# Patient Record
Sex: MALE | Race: WHITE | Employment: FULL TIME | URBAN - METROPOLITAN AREA
[De-identification: names, ages, dates, MRNs, and addresses within clinical notes are randomized per-mention and may not be internally consistent; named-entity substitution may affect disease eponyms.]

---

## 2017-07-24 ENCOUNTER — HOSPITAL ENCOUNTER (EMERGENCY)
Age: 32
Discharge: HOME | End: 2017-07-24
Payer: COMMERCIAL

## 2017-07-24 ENCOUNTER — APPOINTMENT (OUTPATIENT)
Dept: GENERAL RADIOLOGY | Age: 32
End: 2017-07-24
Payer: COMMERCIAL

## 2017-07-24 VITALS
HEART RATE: 72 BPM | HEIGHT: 73 IN | BODY MASS INDEX: 31.14 KG/M2 | WEIGHT: 235 LBS | TEMPERATURE: 97.3 F | RESPIRATION RATE: 14 BRPM | OXYGEN SATURATION: 99 % | DIASTOLIC BLOOD PRESSURE: 83 MMHG | SYSTOLIC BLOOD PRESSURE: 121 MMHG

## 2017-07-24 DIAGNOSIS — S79.912A HIP INJURY, LEFT, INITIAL ENCOUNTER: Primary | ICD-10-CM

## 2017-07-24 PROCEDURE — 73502 X-RAY EXAM HIP UNI 2-3 VIEWS: CPT

## 2017-07-24 PROCEDURE — 99283 EMERGENCY DEPT VISIT LOW MDM: CPT

## 2017-07-24 RX ORDER — ACETAMINOPHEN 500 MG
1000 TABLET ORAL ONCE
Status: DISCONTINUED | OUTPATIENT
Start: 2017-07-24 | End: 2017-07-24

## 2017-07-24 RX ORDER — ACETAMINOPHEN 500 MG
500 TABLET ORAL EVERY 6 HOURS PRN
Qty: 12 TABLET | Refills: 0 | Status: SHIPPED | OUTPATIENT
Start: 2017-07-24 | End: 2017-08-16

## 2017-07-24 ASSESSMENT — PAIN DESCRIPTION - PAIN TYPE: TYPE: ACUTE PAIN

## 2017-07-24 ASSESSMENT — PAIN SCALES - GENERAL: PAINLEVEL_OUTOF10: 3

## 2017-07-24 ASSESSMENT — PAIN DESCRIPTION - LOCATION: LOCATION: HIP

## 2017-07-24 ASSESSMENT — PAIN DESCRIPTION - ORIENTATION: ORIENTATION: LEFT

## 2017-07-24 ASSESSMENT — PAIN DESCRIPTION - DESCRIPTORS: DESCRIPTORS: SORE

## 2017-08-11 ENCOUNTER — OFFICE VISIT (OUTPATIENT)
Dept: FAMILY MEDICINE CLINIC | Age: 32
End: 2017-08-11

## 2017-08-11 VITALS
WEIGHT: 241.2 LBS | TEMPERATURE: 97.6 F | HEART RATE: 75 BPM | HEIGHT: 73 IN | DIASTOLIC BLOOD PRESSURE: 78 MMHG | SYSTOLIC BLOOD PRESSURE: 118 MMHG | OXYGEN SATURATION: 97 % | BODY MASS INDEX: 31.97 KG/M2

## 2017-08-11 DIAGNOSIS — R91.8 PULMONARY INFILTRATE IN RIGHT LUNG ON CXR: Primary | ICD-10-CM

## 2017-08-11 DIAGNOSIS — R07.81 RIB PAIN ON RIGHT SIDE: Primary | ICD-10-CM

## 2017-08-11 PROCEDURE — 99213 OFFICE O/P EST LOW 20 MIN: CPT | Performed by: NURSE PRACTITIONER

## 2017-08-11 PROCEDURE — G8427 DOCREV CUR MEDS BY ELIG CLIN: HCPCS | Performed by: NURSE PRACTITIONER

## 2017-08-11 PROCEDURE — G8417 CALC BMI ABV UP PARAM F/U: HCPCS | Performed by: NURSE PRACTITIONER

## 2017-08-11 PROCEDURE — 1036F TOBACCO NON-USER: CPT | Performed by: NURSE PRACTITIONER

## 2017-08-11 RX ORDER — METHYLPREDNISOLONE 4 MG/1
TABLET ORAL
Qty: 21 TABLET | Refills: 0 | Status: SHIPPED | OUTPATIENT
Start: 2017-08-11 | End: 2017-08-16

## 2017-08-11 ASSESSMENT — ENCOUNTER SYMPTOMS
SHORTNESS OF BREATH: 0
COUGH: 0

## 2017-08-16 ENCOUNTER — OFFICE VISIT (OUTPATIENT)
Dept: PULMONOLOGY | Age: 32
End: 2017-08-16

## 2017-08-16 ENCOUNTER — HOSPITAL ENCOUNTER (OUTPATIENT)
Dept: CT IMAGING | Age: 32
Discharge: HOME | End: 2017-08-16
Payer: COMMERCIAL

## 2017-08-16 VITALS
DIASTOLIC BLOOD PRESSURE: 74 MMHG | TEMPERATURE: 97.4 F | HEART RATE: 79 BPM | BODY MASS INDEX: 32.47 KG/M2 | SYSTOLIC BLOOD PRESSURE: 112 MMHG | WEIGHT: 245 LBS | OXYGEN SATURATION: 98 % | HEIGHT: 73 IN | RESPIRATION RATE: 12 BRPM

## 2017-08-16 VITALS — BODY MASS INDEX: 32.47 KG/M2 | WEIGHT: 245 LBS | HEIGHT: 73 IN

## 2017-08-16 DIAGNOSIS — R07.81 PLEURODYNIA: ICD-10-CM

## 2017-08-16 DIAGNOSIS — R07.81 PLEURODYNIA: Primary | ICD-10-CM

## 2017-08-16 PROCEDURE — 71275 CT ANGIOGRAPHY CHEST: CPT

## 2017-08-16 PROCEDURE — G8427 DOCREV CUR MEDS BY ELIG CLIN: HCPCS | Performed by: INTERNAL MEDICINE

## 2017-08-16 PROCEDURE — 99204 OFFICE O/P NEW MOD 45 MIN: CPT | Performed by: INTERNAL MEDICINE

## 2017-08-16 PROCEDURE — 1036F TOBACCO NON-USER: CPT | Performed by: INTERNAL MEDICINE

## 2017-08-16 PROCEDURE — 2580000003 HC RX 258: Performed by: RADIOLOGY

## 2017-08-16 PROCEDURE — 6360000004 HC RX CONTRAST MEDICATION: Performed by: RADIOLOGY

## 2017-08-16 PROCEDURE — G8417 CALC BMI ABV UP PARAM F/U: HCPCS | Performed by: INTERNAL MEDICINE

## 2017-08-16 RX ORDER — SODIUM CHLORIDE 0.9 % (FLUSH) 0.9 %
10 SYRINGE (ML) INJECTION PRN
Status: DISCONTINUED | OUTPATIENT
Start: 2017-08-16 | End: 2017-08-19 | Stop reason: HOSPADM

## 2017-08-16 RX ADMIN — SODIUM CHLORIDE, PRESERVATIVE FREE 10 ML: 5 INJECTION INTRAVENOUS at 13:26

## 2017-08-16 RX ADMIN — IOPAMIDOL 100 ML: 755 INJECTION, SOLUTION INTRAVENOUS at 13:25

## 2017-08-16 ASSESSMENT — ENCOUNTER SYMPTOMS
COUGH: 0
RHINORRHEA: 0
VOMITING: 0
SORE THROAT: 0
WHEEZING: 1
NAUSEA: 0
SHORTNESS OF BREATH: 0
DIARRHEA: 0
ABDOMINAL PAIN: 0
CHEST TIGHTNESS: 0
SINUS PRESSURE: 0

## 2017-08-25 ENCOUNTER — OFFICE VISIT (OUTPATIENT)
Dept: PULMONOLOGY | Age: 32
End: 2017-08-25

## 2017-08-25 VITALS
OXYGEN SATURATION: 98 % | WEIGHT: 243 LBS | RESPIRATION RATE: 16 BRPM | HEART RATE: 68 BPM | BODY MASS INDEX: 32.2 KG/M2 | TEMPERATURE: 97.6 F | HEIGHT: 73 IN | DIASTOLIC BLOOD PRESSURE: 82 MMHG | SYSTOLIC BLOOD PRESSURE: 124 MMHG

## 2017-08-25 DIAGNOSIS — R07.81 PLEURODYNIA: Primary | ICD-10-CM

## 2017-08-25 PROCEDURE — G8417 CALC BMI ABV UP PARAM F/U: HCPCS | Performed by: INTERNAL MEDICINE

## 2017-08-25 PROCEDURE — G8427 DOCREV CUR MEDS BY ELIG CLIN: HCPCS | Performed by: INTERNAL MEDICINE

## 2017-08-25 PROCEDURE — 99213 OFFICE O/P EST LOW 20 MIN: CPT | Performed by: INTERNAL MEDICINE

## 2017-08-25 PROCEDURE — 1036F TOBACCO NON-USER: CPT | Performed by: INTERNAL MEDICINE

## 2017-08-25 RX ORDER — PREDNISONE 10 MG/1
TABLET ORAL
Qty: 40 TABLET | Refills: 0 | Status: SHIPPED | OUTPATIENT
Start: 2017-08-25 | End: 2018-12-20

## 2017-08-25 ASSESSMENT — ENCOUNTER SYMPTOMS
COUGH: 0
SORE THROAT: 0
CHEST TIGHTNESS: 0
ABDOMINAL PAIN: 0
WHEEZING: 0
VOMITING: 0
SHORTNESS OF BREATH: 1
RHINORRHEA: 0
SINUS PRESSURE: 0
NAUSEA: 0
DIARRHEA: 0

## 2017-09-01 ENCOUNTER — TELEPHONE (OUTPATIENT)
Dept: PULMONOLOGY | Age: 32
End: 2017-09-01

## 2017-09-01 ENCOUNTER — TELEPHONE (OUTPATIENT)
Dept: FAMILY MEDICINE CLINIC | Age: 32
End: 2017-09-01

## 2017-09-01 DIAGNOSIS — R07.81 PLEURODYNIA: Primary | ICD-10-CM

## 2017-09-01 RX ORDER — METHYLPREDNISOLONE 4 MG/1
TABLET ORAL
Qty: 21 TABLET | Refills: 0 | Status: SHIPPED | OUTPATIENT
Start: 2017-09-01 | End: 2017-09-07

## 2017-12-20 ENCOUNTER — TELEPHONE (OUTPATIENT)
Dept: FAMILY MEDICINE CLINIC | Age: 32
End: 2017-12-20

## 2018-05-11 ENCOUNTER — APPOINTMENT (OUTPATIENT)
Dept: GENERAL RADIOLOGY | Age: 33
End: 2018-05-11
Payer: COMMERCIAL

## 2018-05-11 ENCOUNTER — HOSPITAL ENCOUNTER (EMERGENCY)
Age: 33
Discharge: HOME | End: 2018-05-11
Payer: COMMERCIAL

## 2018-05-11 VITALS
BODY MASS INDEX: 30.48 KG/M2 | WEIGHT: 230 LBS | DIASTOLIC BLOOD PRESSURE: 78 MMHG | HEART RATE: 68 BPM | OXYGEN SATURATION: 100 % | TEMPERATURE: 97.5 F | HEIGHT: 73 IN | RESPIRATION RATE: 18 BRPM | SYSTOLIC BLOOD PRESSURE: 131 MMHG

## 2018-05-11 DIAGNOSIS — M25.562 ACUTE PAIN OF LEFT KNEE: Primary | ICD-10-CM

## 2018-05-11 PROCEDURE — 99283 EMERGENCY DEPT VISIT LOW MDM: CPT

## 2018-05-11 PROCEDURE — 73564 X-RAY EXAM KNEE 4 OR MORE: CPT

## 2018-05-11 ASSESSMENT — ENCOUNTER SYMPTOMS
GASTROINTESTINAL NEGATIVE: 1
RESPIRATORY NEGATIVE: 1
EYES NEGATIVE: 1

## 2018-05-11 ASSESSMENT — PAIN SCALES - GENERAL
PAINLEVEL_OUTOF10: 3
PAINLEVEL_OUTOF10: 2

## 2018-05-11 ASSESSMENT — PAIN DESCRIPTION - LOCATION: LOCATION: KNEE

## 2018-05-11 ASSESSMENT — PAIN DESCRIPTION - DESCRIPTORS: DESCRIPTORS: ACHING;DISCOMFORT

## 2018-05-11 ASSESSMENT — PAIN DESCRIPTION - FREQUENCY: FREQUENCY: CONTINUOUS

## 2018-05-11 ASSESSMENT — PAIN DESCRIPTION - ORIENTATION: ORIENTATION: LEFT

## 2018-05-11 ASSESSMENT — PAIN - FUNCTIONAL ASSESSMENT: PAIN_FUNCTIONAL_ASSESSMENT: 0-10

## 2018-05-25 ENCOUNTER — HOSPITAL ENCOUNTER (OUTPATIENT)
Dept: MRI IMAGING | Age: 33
Discharge: HOME | End: 2018-05-27
Payer: COMMERCIAL

## 2018-05-25 DIAGNOSIS — S80.02XA CONTUSION OF LEFT KNEE, INITIAL ENCOUNTER: ICD-10-CM

## 2018-05-25 PROCEDURE — 73721 MRI JNT OF LWR EXTRE W/O DYE: CPT

## 2018-12-20 ENCOUNTER — OFFICE VISIT (OUTPATIENT)
Dept: FAMILY MEDICINE CLINIC | Age: 33
End: 2018-12-20
Payer: COMMERCIAL

## 2018-12-20 VITALS
TEMPERATURE: 97.6 F | WEIGHT: 233 LBS | SYSTOLIC BLOOD PRESSURE: 124 MMHG | OXYGEN SATURATION: 97 % | BODY MASS INDEX: 30.88 KG/M2 | HEIGHT: 73 IN | DIASTOLIC BLOOD PRESSURE: 82 MMHG | HEART RATE: 70 BPM

## 2018-12-20 DIAGNOSIS — Z00.00 WELL ADULT EXAM: Primary | ICD-10-CM

## 2018-12-20 DIAGNOSIS — R49.9 CHANGE IN VOICE: ICD-10-CM

## 2018-12-20 PROCEDURE — 99395 PREV VISIT EST AGE 18-39: CPT | Performed by: NURSE PRACTITIONER

## 2018-12-20 PROCEDURE — G8484 FLU IMMUNIZE NO ADMIN: HCPCS | Performed by: NURSE PRACTITIONER

## 2018-12-20 ASSESSMENT — PATIENT HEALTH QUESTIONNAIRE - PHQ9
SUM OF ALL RESPONSES TO PHQ9 QUESTIONS 1 & 2: 0
1. LITTLE INTEREST OR PLEASURE IN DOING THINGS: 0
2. FEELING DOWN, DEPRESSED OR HOPELESS: 0
SUM OF ALL RESPONSES TO PHQ QUESTIONS 1-9: 0
SUM OF ALL RESPONSES TO PHQ QUESTIONS 1-9: 0

## 2018-12-20 ASSESSMENT — ENCOUNTER SYMPTOMS
SINUS PRESSURE: 0
TROUBLE SWALLOWING: 0
WHEEZING: 0
SORE THROAT: 0
SHORTNESS OF BREATH: 0
CHEST TIGHTNESS: 0
SINUS PAIN: 0
VOICE CHANGE: 1

## 2019-12-02 ENCOUNTER — APPOINTMENT (OUTPATIENT)
Dept: GENERAL RADIOLOGY | Age: 34
End: 2019-12-02
Payer: COMMERCIAL

## 2019-12-02 ENCOUNTER — HOSPITAL ENCOUNTER (EMERGENCY)
Age: 34
Discharge: HOME | End: 2019-12-02
Payer: COMMERCIAL

## 2019-12-02 VITALS
SYSTOLIC BLOOD PRESSURE: 125 MMHG | HEART RATE: 62 BPM | BODY MASS INDEX: 31.81 KG/M2 | OXYGEN SATURATION: 98 % | TEMPERATURE: 98.6 F | RESPIRATION RATE: 16 BRPM | DIASTOLIC BLOOD PRESSURE: 91 MMHG | WEIGHT: 240 LBS | HEIGHT: 73 IN

## 2019-12-02 DIAGNOSIS — M25.512 ARTHRALGIA OF SHOULDER REGION, LEFT: ICD-10-CM

## 2019-12-02 DIAGNOSIS — M25.552 ARTHRALGIA OF LEFT HIP: Primary | ICD-10-CM

## 2019-12-02 PROCEDURE — 99283 EMERGENCY DEPT VISIT LOW MDM: CPT

## 2019-12-02 PROCEDURE — 73502 X-RAY EXAM HIP UNI 2-3 VIEWS: CPT

## 2019-12-02 PROCEDURE — 73030 X-RAY EXAM OF SHOULDER: CPT

## 2019-12-02 RX ORDER — ACETAMINOPHEN 500 MG
1000 TABLET ORAL EVERY 6 HOURS PRN
Qty: 30 TABLET | Refills: 0 | Status: SHIPPED | OUTPATIENT
Start: 2019-12-02 | End: 2021-12-17

## 2019-12-02 RX ORDER — IBUPROFEN 800 MG/1
800 TABLET ORAL ONCE
Status: DISCONTINUED | OUTPATIENT
Start: 2019-12-02 | End: 2019-12-02 | Stop reason: HOSPADM

## 2019-12-02 ASSESSMENT — ENCOUNTER SYMPTOMS
COUGH: 0
RHINORRHEA: 0
SHORTNESS OF BREATH: 0
SORE THROAT: 0
EYE PAIN: 0
DIARRHEA: 0
BACK PAIN: 0
ABDOMINAL PAIN: 0
VOMITING: 0
NAUSEA: 0
PHOTOPHOBIA: 0

## 2019-12-02 ASSESSMENT — PAIN DESCRIPTION - ORIENTATION: ORIENTATION: LEFT

## 2019-12-02 ASSESSMENT — PAIN DESCRIPTION - LOCATION: LOCATION: HIP

## 2019-12-02 ASSESSMENT — PAIN SCALES - GENERAL: PAINLEVEL_OUTOF10: 3

## 2019-12-09 ENCOUNTER — HOSPITAL ENCOUNTER (OUTPATIENT)
Dept: PHYSICAL THERAPY | Age: 34
Setting detail: THERAPIES SERIES
Discharge: HOME | End: 2019-12-09
Payer: COMMERCIAL

## 2019-12-09 PROCEDURE — 97110 THERAPEUTIC EXERCISES: CPT

## 2019-12-09 PROCEDURE — 97162 PT EVAL MOD COMPLEX 30 MIN: CPT

## 2019-12-09 ASSESSMENT — PAIN DESCRIPTION - ONSET: ONSET: PROGRESSIVE

## 2019-12-09 ASSESSMENT — PAIN SCALES - GENERAL: PAINLEVEL_OUTOF10: 3

## 2019-12-09 ASSESSMENT — PAIN - FUNCTIONAL ASSESSMENT: PAIN_FUNCTIONAL_ASSESSMENT: PREVENTS OR INTERFERES SOME ACTIVE ACTIVITIES AND ADLS

## 2019-12-09 ASSESSMENT — PAIN DESCRIPTION - PROGRESSION: CLINICAL_PROGRESSION: GRADUALLY IMPROVING

## 2019-12-09 ASSESSMENT — PAIN DESCRIPTION - LOCATION: LOCATION: HIP;SHOULDER

## 2019-12-09 ASSESSMENT — PAIN DESCRIPTION - PAIN TYPE: TYPE: ACUTE PAIN

## 2019-12-09 ASSESSMENT — PAIN DESCRIPTION - FREQUENCY: FREQUENCY: CONTINUOUS

## 2019-12-09 ASSESSMENT — PAIN DESCRIPTION - DESCRIPTORS: DESCRIPTORS: ACHING;SHARP

## 2019-12-11 ENCOUNTER — HOSPITAL ENCOUNTER (OUTPATIENT)
Dept: PHYSICAL THERAPY | Age: 34
Setting detail: THERAPIES SERIES
Discharge: HOME | End: 2019-12-11
Payer: COMMERCIAL

## 2019-12-11 PROCEDURE — 97035 APP MDLTY 1+ULTRASOUND EA 15: CPT

## 2019-12-11 PROCEDURE — 97110 THERAPEUTIC EXERCISES: CPT

## 2019-12-11 PROCEDURE — 97124 MASSAGE THERAPY: CPT

## 2019-12-11 ASSESSMENT — PAIN SCALES - GENERAL: PAINLEVEL_OUTOF10: 3

## 2019-12-11 ASSESSMENT — PAIN DESCRIPTION - PROGRESSION: CLINICAL_PROGRESSION: GRADUALLY IMPROVING

## 2019-12-11 ASSESSMENT — PAIN DESCRIPTION - LOCATION: LOCATION: HIP;SHOULDER

## 2019-12-16 ENCOUNTER — HOSPITAL ENCOUNTER (OUTPATIENT)
Dept: PHYSICAL THERAPY | Age: 34
Setting detail: THERAPIES SERIES
Discharge: HOME | End: 2019-12-16
Payer: COMMERCIAL

## 2019-12-16 PROCEDURE — 97035 APP MDLTY 1+ULTRASOUND EA 15: CPT

## 2019-12-16 PROCEDURE — G0283 ELEC STIM OTHER THAN WOUND: HCPCS

## 2019-12-16 PROCEDURE — 97124 MASSAGE THERAPY: CPT

## 2019-12-16 PROCEDURE — 97110 THERAPEUTIC EXERCISES: CPT

## 2019-12-16 ASSESSMENT — PAIN SCALES - GENERAL: PAINLEVEL_OUTOF10: 3

## 2019-12-16 ASSESSMENT — PAIN DESCRIPTION - ORIENTATION: ORIENTATION: LEFT

## 2019-12-16 ASSESSMENT — PAIN DESCRIPTION - LOCATION: LOCATION: HIP

## 2019-12-21 ENCOUNTER — OFFICE VISIT (OUTPATIENT)
Dept: FAMILY MEDICINE CLINIC | Age: 34
End: 2019-12-21
Payer: COMMERCIAL

## 2019-12-21 VITALS
BODY MASS INDEX: 33.18 KG/M2 | TEMPERATURE: 96.6 F | HEART RATE: 75 BPM | OXYGEN SATURATION: 97 % | DIASTOLIC BLOOD PRESSURE: 74 MMHG | WEIGHT: 245 LBS | HEIGHT: 72 IN | SYSTOLIC BLOOD PRESSURE: 114 MMHG

## 2019-12-21 DIAGNOSIS — Z00.00 ENCOUNTER FOR PREVENTIVE HEALTH EXAMINATION: Primary | ICD-10-CM

## 2019-12-21 PROCEDURE — G8484 FLU IMMUNIZE NO ADMIN: HCPCS | Performed by: NURSE PRACTITIONER

## 2019-12-21 PROCEDURE — 99395 PREV VISIT EST AGE 18-39: CPT | Performed by: NURSE PRACTITIONER

## 2019-12-21 SDOH — ECONOMIC STABILITY: FOOD INSECURITY: WITHIN THE PAST 12 MONTHS, YOU WORRIED THAT YOUR FOOD WOULD RUN OUT BEFORE YOU GOT MONEY TO BUY MORE.: NEVER TRUE

## 2019-12-21 SDOH — ECONOMIC STABILITY: INCOME INSECURITY: HOW HARD IS IT FOR YOU TO PAY FOR THE VERY BASICS LIKE FOOD, HOUSING, MEDICAL CARE, AND HEATING?: NOT HARD AT ALL

## 2019-12-21 SDOH — ECONOMIC STABILITY: TRANSPORTATION INSECURITY
IN THE PAST 12 MONTHS, HAS THE LACK OF TRANSPORTATION KEPT YOU FROM MEDICAL APPOINTMENTS OR FROM GETTING MEDICATIONS?: NO

## 2019-12-21 SDOH — ECONOMIC STABILITY: TRANSPORTATION INSECURITY
IN THE PAST 12 MONTHS, HAS LACK OF TRANSPORTATION KEPT YOU FROM MEETINGS, WORK, OR FROM GETTING THINGS NEEDED FOR DAILY LIVING?: NO

## 2019-12-21 ASSESSMENT — ENCOUNTER SYMPTOMS
ABDOMINAL PAIN: 0
SHORTNESS OF BREATH: 0
COUGH: 0

## 2019-12-21 ASSESSMENT — PATIENT HEALTH QUESTIONNAIRE - PHQ9
SUM OF ALL RESPONSES TO PHQ QUESTIONS 1-9: 0
SUM OF ALL RESPONSES TO PHQ9 QUESTIONS 1 & 2: 0
1. LITTLE INTEREST OR PLEASURE IN DOING THINGS: 0
SUM OF ALL RESPONSES TO PHQ QUESTIONS 1-9: 0
2. FEELING DOWN, DEPRESSED OR HOPELESS: 0

## 2019-12-23 ENCOUNTER — HOSPITAL ENCOUNTER (OUTPATIENT)
Dept: PHYSICAL THERAPY | Age: 34
Setting detail: THERAPIES SERIES
Discharge: HOME | End: 2019-12-23
Payer: COMMERCIAL

## 2019-12-23 PROCEDURE — 97110 THERAPEUTIC EXERCISES: CPT

## 2019-12-23 PROCEDURE — 97124 MASSAGE THERAPY: CPT

## 2019-12-23 PROCEDURE — 97035 APP MDLTY 1+ULTRASOUND EA 15: CPT

## 2019-12-23 ASSESSMENT — PAIN DESCRIPTION - ORIENTATION: ORIENTATION: LEFT

## 2019-12-23 ASSESSMENT — PAIN DESCRIPTION - DESCRIPTORS: DESCRIPTORS: ACHING

## 2019-12-23 ASSESSMENT — PAIN SCALES - GENERAL: PAINLEVEL_OUTOF10: 4

## 2019-12-23 ASSESSMENT — PAIN DESCRIPTION - LOCATION: LOCATION: SHOULDER

## 2019-12-30 ENCOUNTER — HOSPITAL ENCOUNTER (OUTPATIENT)
Dept: PHYSICAL THERAPY | Age: 34
Setting detail: THERAPIES SERIES
Discharge: HOME | End: 2019-12-30
Payer: COMMERCIAL

## 2019-12-30 PROCEDURE — 97110 THERAPEUTIC EXERCISES: CPT

## 2019-12-30 PROCEDURE — G0283 ELEC STIM OTHER THAN WOUND: HCPCS

## 2019-12-30 ASSESSMENT — PAIN DESCRIPTION - ORIENTATION: ORIENTATION: LEFT

## 2019-12-30 ASSESSMENT — PAIN DESCRIPTION - LOCATION: LOCATION: HIP;SHOULDER

## 2019-12-30 ASSESSMENT — PAIN SCALES - GENERAL: PAINLEVEL_OUTOF10: 1

## 2019-12-30 NOTE — PROGRESS NOTES
[facility]   Outpatient Physical Therapy   Treatment Note  [] [address]  [x] [facility]  Date: 2019  Patient: Devin Joe  : 1985  ACCT #: [de-identified]  Referring Practitioner: Mook Quintanilla CNP  Diagnosis: left hip and shoulder pain    Visit Information:  PT Visit Information  Onset Date: 19  PT Insurance Information: [facility]  Total # of Visits Approved: 10  Total # of Visits to Date: 7  No Show: 0  Canceled Appointment: 0  Progress Note Counter: 7/10    SUBJECTIVE:   Subjective  Subjective: Pt reports hip is overall feeling good. Reports decrease pain in shoulder, however, still feeling unstable. HEP Compliance:  [x] Good [] Fair [] Poor [] Reports not doing due to:    PAIN   Location:   Pain Location: Hip, Shoulder  Pain Rating (0-10 pain scale):  Pain Level: 1(1/10 hip; 2/10 shldr)  Pain Description:     Action:  [x] Acceptable for treatment  []  Other:    OBJECTIVE:   Exercises  Exercise 1: left piriformis stretching 30s/3  Exercise 6: shoulder IR/ER active ROM in side lying 2 lb. Exercise 7: side lying  rows with LUE with 2 lbs. Exercise 8: prone hip flexor stretching 20 second holds  Exercise 9: prone middle trap strengthening 2 lbs left UE  Exercise 10: prone lower trap strengthenng 2 lbs left LUE  Exercise 11: prone rows left UE 4 lbs. Exercise 12: Prone triceps extension Left UE 4lbs. Exercise 13: sidelying LUE punches  Exercise 17: wall ball exercises up/down and circular motion each direction    Modalities: []  NA  Modalities  E-stim (parameters): IFC left shoulder to decrease pain and inflammation x 15min       HEP  Continue with current Home Exercise Program.  See Objective section for progression of HEP. Comments:       POST-PAIN    Pain Rating (0-10 pain scale): 2/10  Location and Pain Description same as pre-pain unless otherwise indicated.   Action: [] NA  [] Call Physician  [x] Perform HEP

## 2020-01-02 ENCOUNTER — HOSPITAL ENCOUNTER (OUTPATIENT)
Dept: PHYSICAL THERAPY | Age: 35
Setting detail: THERAPIES SERIES
Discharge: HOME | End: 2020-01-02
Payer: COMMERCIAL

## 2020-01-02 PROCEDURE — 97110 THERAPEUTIC EXERCISES: CPT

## 2020-01-02 PROCEDURE — G0283 ELEC STIM OTHER THAN WOUND: HCPCS

## 2020-01-02 PROCEDURE — 97140 MANUAL THERAPY 1/> REGIONS: CPT

## 2020-01-02 ASSESSMENT — PAIN SCALES - GENERAL: PAINLEVEL_OUTOF10: 1

## 2020-01-02 ASSESSMENT — PAIN DESCRIPTION - ORIENTATION: ORIENTATION: LEFT

## 2020-01-02 ASSESSMENT — PAIN DESCRIPTION - DESCRIPTORS: DESCRIPTORS: SORE

## 2020-01-02 ASSESSMENT — PAIN DESCRIPTION - LOCATION: LOCATION: HIP;SHOULDER

## 2020-12-18 ENCOUNTER — OFFICE VISIT (OUTPATIENT)
Dept: FAMILY MEDICINE CLINIC | Age: 35
End: 2020-12-18
Payer: COMMERCIAL

## 2020-12-18 VITALS
SYSTOLIC BLOOD PRESSURE: 102 MMHG | OXYGEN SATURATION: 99 % | BODY MASS INDEX: 33.13 KG/M2 | HEIGHT: 73 IN | TEMPERATURE: 97.4 F | HEART RATE: 77 BPM | WEIGHT: 250 LBS | DIASTOLIC BLOOD PRESSURE: 82 MMHG

## 2020-12-18 PROCEDURE — G8484 FLU IMMUNIZE NO ADMIN: HCPCS | Performed by: NURSE PRACTITIONER

## 2020-12-18 PROCEDURE — 99395 PREV VISIT EST AGE 18-39: CPT | Performed by: NURSE PRACTITIONER

## 2020-12-18 ASSESSMENT — ENCOUNTER SYMPTOMS
SHORTNESS OF BREATH: 0
ABDOMINAL DISTENTION: 0
ABDOMINAL PAIN: 0
BACK PAIN: 0
CHEST TIGHTNESS: 0
COUGH: 0
PHOTOPHOBIA: 0

## 2020-12-18 ASSESSMENT — PATIENT HEALTH QUESTIONNAIRE - PHQ9
SUM OF ALL RESPONSES TO PHQ QUESTIONS 1-9: 0
1. LITTLE INTEREST OR PLEASURE IN DOING THINGS: 0
2. FEELING DOWN, DEPRESSED OR HOPELESS: 0
SUM OF ALL RESPONSES TO PHQ QUESTIONS 1-9: 0
SUM OF ALL RESPONSES TO PHQ QUESTIONS 1-9: 0
SUM OF ALL RESPONSES TO PHQ9 QUESTIONS 1 & 2: 0

## 2020-12-18 NOTE — PROGRESS NOTES
Subjective  Chief Complaint   Patient presents with    Employment Physical       HPI    Pt here for annual physical exam.    No issues or concerns. Work is going well, works at [employer]. Mentions his weight is more than he would like it. Goal is to get to 200 lbs. Unsure of date of last tdap. 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 Past Surgical History:   Procedure Laterality Date    ANTERIOR CRUCIATE LIGAMENT REPAIR Left 2003    KNEE CARTILAGE SURGERY Left 2003    TONSILLECTOMY Bilateral 2010    WISDOM TOOTH EXTRACTION  1998    3 extracted     No family history on file.   Social History     Socioeconomic History    Marital status:      Spouse name: None    Number of children: None    Years of education: None    Highest education level: None   Occupational History    None   Social Needs    Financial resource strain: Not hard at all   Myrtle-Anamika insecurity     Worry: Never true     Inability: None    Transportation needs     Medical: No     Non-medical: No   Tobacco Use    Smoking status: Never Smoker    Smokeless tobacco: Never Used   Substance and Sexual Activity    Alcohol use: None     Comment: rarely    Drug use: No    Sexual activity: None   Lifestyle    Physical activity     Days per week: None     Minutes per session: None    Stress: None   Relationships    Social connections     Talks on phone: None     Gets together: None     Attends Judaism service: None     Active member of club or organization: None     Attends meetings of clubs or organizations: None     Relationship status: None    Intimate partner violence     Fear of current or ex partner: None     Emotionally abused: None     Physically abused: None     Forced sexual activity: None Right Ear: Hearing, tympanic membrane, ear canal and external ear normal.      Left Ear: Hearing, tympanic membrane, ear canal and external ear normal.      Nose: Nose normal. No congestion or rhinorrhea. Mouth/Throat:      Mouth: Mucous membranes are moist.      Pharynx: Oropharynx is clear. No oropharyngeal exudate or posterior oropharyngeal erythema. Eyes:      General:         Right eye: No discharge. Left eye: No discharge. Conjunctiva/sclera: Conjunctivae normal.      Pupils: Pupils are equal, round, and reactive to light. Neck:      Musculoskeletal: Normal range of motion and neck supple. No muscular tenderness. Thyroid: No thyromegaly. Cardiovascular:      Rate and Rhythm: Normal rate and regular rhythm. Pulses: Normal pulses. Heart sounds: Normal heart sounds. No murmur. Pulmonary:      Effort: Pulmonary effort is normal. No respiratory distress. Breath sounds: Normal breath sounds. No stridor. No wheezing, rhonchi or rales. Chest:      Chest wall: No tenderness. Musculoskeletal: Normal range of motion. Right lower leg: No edema. Left lower leg: No edema. Lymphadenopathy:      Cervical: No cervical adenopathy. Skin:     General: Skin is warm and dry. Capillary Refill: Capillary refill takes less than 2 seconds. Coloration: Skin is not jaundiced or pale. Findings: No bruising, erythema, lesion or rash. Neurological:      General: No focal deficit present. Mental Status: He is alert and oriented to person, place, and time. Mental status is at baseline. Cranial Nerves: No cranial nerve deficit. Coordination: Coordination normal.      Gait: Gait normal.   Psychiatric:         Mood and Affect: Mood normal.         Speech: Speech normal.         Behavior: Behavior normal.         Thought Content:  Thought content normal.         Judgment: Judgment normal.         Assessment& Plan

## 2021-12-10 ENCOUNTER — OFFICE VISIT (OUTPATIENT)
Dept: FAMILY MEDICINE CLINIC | Age: 36
End: 2021-12-10
Payer: COMMERCIAL

## 2021-12-10 VITALS
OXYGEN SATURATION: 98 % | BODY MASS INDEX: 33.13 KG/M2 | SYSTOLIC BLOOD PRESSURE: 130 MMHG | TEMPERATURE: 97.8 F | HEIGHT: 73 IN | WEIGHT: 250 LBS | DIASTOLIC BLOOD PRESSURE: 80 MMHG | HEART RATE: 80 BPM

## 2021-12-10 DIAGNOSIS — Z00.00 ENCOUNTER FOR PREVENTIVE HEALTH EXAMINATION: Primary | ICD-10-CM

## 2021-12-10 DIAGNOSIS — Z13.220 LIPID SCREENING: ICD-10-CM

## 2021-12-10 DIAGNOSIS — R45.89 DEPRESSED MOOD: ICD-10-CM

## 2021-12-10 DIAGNOSIS — Z23 NEED FOR TDAP VACCINATION: ICD-10-CM

## 2021-12-10 DIAGNOSIS — Z00.00 ENCOUNTER FOR PREVENTIVE HEALTH EXAMINATION: ICD-10-CM

## 2021-12-10 LAB
ALBUMIN SERPL-MCNC: 4.9 G/DL (ref 3.5–4.6)
ALP BLD-CCNC: 101 U/L (ref 35–104)
ALT SERPL-CCNC: 37 U/L (ref 0–41)
ANION GAP SERPL CALCULATED.3IONS-SCNC: 19 MEQ/L (ref 9–15)
AST SERPL-CCNC: 31 U/L (ref 0–40)
BILIRUB SERPL-MCNC: 0.3 MG/DL (ref 0.2–0.7)
BUN BLDV-MCNC: 23 MG/DL (ref 6–20)
CALCIUM SERPL-MCNC: 9.5 MG/DL (ref 8.5–9.9)
CHLORIDE BLD-SCNC: 103 MEQ/L (ref 95–107)
CHOLESTEROL, TOTAL: 263 MG/DL (ref 0–199)
CO2: 21 MEQ/L (ref 20–31)
CREAT SERPL-MCNC: 0.86 MG/DL (ref 0.7–1.2)
GFR AFRICAN AMERICAN: >60
GFR NON-AFRICAN AMERICAN: >60
GLOBULIN: 2.7 G/DL (ref 2.3–3.5)
GLUCOSE BLD-MCNC: 111 MG/DL (ref 70–99)
HCT VFR BLD CALC: 46.9 % (ref 42–52)
HDLC SERPL-MCNC: 47 MG/DL (ref 40–59)
HEMOGLOBIN: 15.4 G/DL (ref 14–18)
LDL CHOLESTEROL CALCULATED: 152 MG/DL (ref 0–129)
MCH RBC QN AUTO: 28.9 PG (ref 27–31.3)
MCHC RBC AUTO-ENTMCNC: 32.9 % (ref 33–37)
MCV RBC AUTO: 87.8 FL (ref 80–100)
PDW BLD-RTO: 13.7 % (ref 11.5–14.5)
PLATELET # BLD: 223 K/UL (ref 130–400)
POTASSIUM SERPL-SCNC: 4.2 MEQ/L (ref 3.4–4.9)
RBC # BLD: 5.34 M/UL (ref 4.7–6.1)
SODIUM BLD-SCNC: 143 MEQ/L (ref 135–144)
TOTAL PROTEIN: 7.6 G/DL (ref 6.3–8)
TRIGL SERPL-MCNC: 322 MG/DL (ref 0–150)
WBC # BLD: 8.6 K/UL (ref 4.8–10.8)

## 2021-12-10 PROCEDURE — 90471 IMMUNIZATION ADMIN: CPT | Performed by: NURSE PRACTITIONER

## 2021-12-10 PROCEDURE — 90715 TDAP VACCINE 7 YRS/> IM: CPT | Performed by: NURSE PRACTITIONER

## 2021-12-10 PROCEDURE — G8484 FLU IMMUNIZE NO ADMIN: HCPCS | Performed by: NURSE PRACTITIONER

## 2021-12-10 PROCEDURE — 99395 PREV VISIT EST AGE 18-39: CPT | Performed by: NURSE PRACTITIONER

## 2021-12-10 SDOH — ECONOMIC STABILITY: FOOD INSECURITY: WITHIN THE PAST 12 MONTHS, YOU WORRIED THAT YOUR FOOD WOULD RUN OUT BEFORE YOU GOT MONEY TO BUY MORE.: NEVER TRUE

## 2021-12-10 SDOH — ECONOMIC STABILITY: FOOD INSECURITY: WITHIN THE PAST 12 MONTHS, THE FOOD YOU BOUGHT JUST DIDN'T LAST AND YOU DIDN'T HAVE MONEY TO GET MORE.: NEVER TRUE

## 2021-12-10 ASSESSMENT — ENCOUNTER SYMPTOMS
TROUBLE SWALLOWING: 0
SHORTNESS OF BREATH: 0
COUGH: 0
CHEST TIGHTNESS: 0
ABDOMINAL PAIN: 0
BACK PAIN: 0
CONSTIPATION: 0
DIARRHEA: 0
EYE PAIN: 0
COLOR CHANGE: 0

## 2021-12-10 ASSESSMENT — PATIENT HEALTH QUESTIONNAIRE - PHQ9
9. THOUGHTS THAT YOU WOULD BE BETTER OFF DEAD, OR OF HURTING YOURSELF: 0
3. TROUBLE FALLING OR STAYING ASLEEP: 0
2. FEELING DOWN, DEPRESSED OR HOPELESS: 3
10. IF YOU CHECKED OFF ANY PROBLEMS, HOW DIFFICULT HAVE THESE PROBLEMS MADE IT FOR YOU TO DO YOUR WORK, TAKE CARE OF THINGS AT HOME, OR GET ALONG WITH OTHER PEOPLE: 1
SUM OF ALL RESPONSES TO PHQ QUESTIONS 1-9: 8
SUM OF ALL RESPONSES TO PHQ QUESTIONS 1-9: 8
5. POOR APPETITE OR OVEREATING: 0
SUM OF ALL RESPONSES TO PHQ9 QUESTIONS 1 & 2: 5
1. LITTLE INTEREST OR PLEASURE IN DOING THINGS: 2
6. FEELING BAD ABOUT YOURSELF - OR THAT YOU ARE A FAILURE OR HAVE LET YOURSELF OR YOUR FAMILY DOWN: 3
SUM OF ALL RESPONSES TO PHQ QUESTIONS 1-9: 8
8. MOVING OR SPEAKING SO SLOWLY THAT OTHER PEOPLE COULD HAVE NOTICED. OR THE OPPOSITE, BEING SO FIGETY OR RESTLESS THAT YOU HAVE BEEN MOVING AROUND A LOT MORE THAN USUAL: 0
4. FEELING TIRED OR HAVING LITTLE ENERGY: 0

## 2021-12-10 ASSESSMENT — SOCIAL DETERMINANTS OF HEALTH (SDOH): HOW HARD IS IT FOR YOU TO PAY FOR THE VERY BASICS LIKE FOOD, HOUSING, MEDICAL CARE, AND HEATING?: NOT HARD AT ALL

## 2021-12-10 NOTE — PROGRESS NOTES
After obtaining consent, and per orders of Dr. Eve Rice, injection of tdap given in Left deltoid by Tasha Peña MA. Patient instructed to remain in clinic for 20 minutes afterwards, and to report any adverse reaction to me immediately.

## 2021-12-10 NOTE — PROGRESS NOTES
Subjective  Chief Complaint   Patient presents with    Annual Exam     INS    Depression     scored an [address] Maintenance     flu declined       HPI    Annual physical exam.    Recently -doing okay, but some depression while dealing with it. Feels he is getting through it okay for now and things are getting better. Declines medication or counseling. Declines covid vaccine at this time. BP well controlled/stable. Due for screening labs. Is agreeable to updated tdap today. 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 Past Surgical History:   Procedure Laterality Date    ANTERIOR CRUCIATE LIGAMENT REPAIR Left 2003    KNEE CARTILAGE SURGERY Left 2003    TONSILLECTOMY Bilateral 2010    WISDOM TOOTH EXTRACTION  1998    3 extracted     No family history on file. Social History     Socioeconomic History    Marital status:      Spouse name: None    Number of children: None    Years of education: None    Highest education level: None   Occupational History    None   Tobacco Use    Smoking status: Never Smoker    Smokeless tobacco: Never Used   Substance and Sexual Activity    Alcohol use: None     Comment: rarely    Drug use: No    Sexual activity: None   Other Topics Concern    None   Social History Narrative    None     Social Determinants of Health     Financial Resource Strain: Low Risk     Difficulty of Paying Living Expenses: Not hard at all   Food Insecurity: No Food Insecurity    Worried About Running Out of Food in the Last Year: Never true    [address] in the Last Year: Never true   Transportation Needs: Unknown    Lack of Transportation (Medical): No    Lack of Transportation (Non-Medical):  Not on file   Physical Activity:     Days of Exercise per Week: Not on file    Minutes of Exercise per Session: Not on file   Stress:     Feeling of Stress : Not on file   Social Connections:     Frequency of Communication with Friends and Family: Not on file    Frequency of Social Gatherings with Friends and Family: Not on file    Attends Christianity Services: Not on file    Active Member of [address] [place] [address] or Organizations: Not on file    Attends Club or Organization Meetings: Not on file    Marital Status: Not on file   Intimate Partner Violence:     Fear of Current or Ex-Partner: Not on file    Emotionally Abused: Not on file    Physically Abused: Not on file    Sexually Abused: Not on file   Housing Stability:     Unable to Pay for Housing in the Last Year: Not on file    Number of [place] in the Last Year: Not on file    Unstable Housing in the Last Year: Not on file     Current Outpatient Medications on File Prior to Visit   Medication Sig Dispense Refill    acetaminophen (APAP EXTRA STRENGTH) 500 MG tablet Take 2 tablets by mouth every 6 hours as needed for Pain 30 tablet 0     No current facility-administered medications on file prior to visit. Allergies   Allergen Reactions    Ibuprofen Other (See Comments)     Pt only has 1 kidney     Penicillins Other (See Comments)     Hereditary hx    Sulphadimidine [Sulfamethazine] Other (See Comments)     Hereditary hx, all sulpha products       Review of Systems   Constitutional: Negative for activity change, appetite change, chills, diaphoresis, fatigue and fever. HENT: Negative for congestion, ear pain, hearing loss and trouble swallowing. Eyes: Negative for pain and visual disturbance. Respiratory: Negative for cough, chest tightness and shortness of breath. Cardiovascular: Negative for chest pain, palpitations and leg swelling. Gastrointestinal: Negative for abdominal pain, constipation and diarrhea. Endocrine: Negative for polydipsia, polyphagia and polyuria.    Genitourinary: Negative for difficulty urinating. Musculoskeletal: Negative for arthralgias and back pain. Skin: Negative for color change and rash. Neurological: Negative for dizziness and light-headedness. Psychiatric/Behavioral: Positive for dysphoric mood. The patient is not nervous/anxious. Objective  Vitals:    12/10/21 0740   BP: 130/80   Pulse: 80   Temp: 97.8 °F (36.6 °C)   SpO2: 98%   Weight: 250 lb (113.4 kg)   Height: 6' 1\" (1.854 m)     Physical Exam  Vitals reviewed. Constitutional:       General: He is not in acute distress. Appearance: Normal appearance. He is well-developed and normal weight. He is not diaphoretic. HENT:      Head: Normocephalic and atraumatic. Right Ear: Hearing, tympanic membrane, ear canal and external ear normal.      Left Ear: Hearing, tympanic membrane, ear canal and external ear normal.      Nose: Nose normal. No congestion or rhinorrhea. Mouth/Throat:      Mouth: Mucous membranes are moist.      Pharynx: Oropharynx is clear. No oropharyngeal exudate or posterior oropharyngeal erythema. Eyes:      General:         Right eye: No discharge. Left eye: No discharge. Conjunctiva/sclera: Conjunctivae normal.      Pupils: Pupils are equal, round, and reactive to light. Neck:      Thyroid: No thyromegaly. Cardiovascular:      Rate and Rhythm: Normal rate and regular rhythm. Pulses: Normal pulses. Heart sounds: Normal heart sounds. No murmur heard. Pulmonary:      Effort: Pulmonary effort is normal. No respiratory distress. Breath sounds: Normal breath sounds. No stridor. No wheezing, rhonchi or rales. Chest:      Chest wall: No tenderness. Musculoskeletal:         General: Normal range of motion. Cervical back: Normal range of motion and neck supple. No muscular tenderness. Right lower leg: No edema. Left lower leg: No edema. Lymphadenopathy:      Cervical: No cervical adenopathy.    Skin:     General: Skin is warm and dry.      Capillary Refill: Capillary refill takes less than 2 seconds. Coloration: Skin is not jaundiced or pale. Findings: No bruising, erythema, lesion or rash. Neurological:      General: No focal deficit present. Mental Status: He is alert and oriented to person, place, and time. Mental status is at baseline. Cranial Nerves: No cranial nerve deficit. Coordination: Coordination normal.      Gait: Gait normal.   Psychiatric:         Mood and Affect: Mood normal.         Speech: Speech normal.         Behavior: Behavior normal.         Thought Content: Thought content normal.         Judgment: Judgment normal.         Assessment& Plan     Diagnosis Orders   1. Encounter for preventive health examination  CBC    Comprehensive Metabolic Panel   2. Lipid screening  Lipid Panel   3. Depressed mood     4. Need for Tdap vaccination  Tdap (age 6y and older) IM ([address])     Check labs. Update tdap. Will f/u if needed for depression. F/u annually and PRN>  Side effects, adverse effects of the medication prescribed today, as well as treatment plan/ rationale and result expectations have been discussed with the patient who expresses understanding and desires to proceed. Close follow up to evaluate treatment results and for coordination of care. I have reviewed the patient's medical history in detail and updated the computerized patient record. As always, patient is advised that if symptoms worsen in any way they will proceed to the nearest emergency room.        Orders Placed This Encounter   Procedures    Tdap (age 6y and older) IM ([address])    CBC     Standing Status:   Future     Number of Occurrences:   1     Standing Expiration Date:   12/10/2022    Lipid Panel     Standing Status:   Future     Number of Occurrences:   1     Standing Expiration Date:   12/10/2022     Order Specific Question:   Is Patient Fasting?/# of Hours     Answer:   10    Comprehensive Metabolic Panel Standing Status:   Future     Number of Occurrences:   1     Standing Expiration Date:   12/10/2022       No orders of the defined types were placed in this encounter. There are no discontinued medications.     Return in about 1 year (around 12/10/2022) for annual exam.    Terry Coyle, NUSRAT - CNP

## 2021-12-14 DIAGNOSIS — R73.9 BLOOD GLUCOSE ELEVATED: Primary | ICD-10-CM

## 2021-12-14 LAB — HBA1C MFR BLD: 5.8 % (ref 4.8–5.9)

## 2021-12-17 ENCOUNTER — OFFICE VISIT (OUTPATIENT)
Dept: FAMILY MEDICINE CLINIC | Age: 36
End: 2021-12-17
Payer: COMMERCIAL

## 2021-12-17 VITALS
DIASTOLIC BLOOD PRESSURE: 84 MMHG | SYSTOLIC BLOOD PRESSURE: 118 MMHG | WEIGHT: 251 LBS | OXYGEN SATURATION: 98 % | BODY MASS INDEX: 33.27 KG/M2 | HEIGHT: 73 IN | HEART RATE: 84 BPM | TEMPERATURE: 98 F

## 2021-12-17 DIAGNOSIS — E78.1 HYPERTRIGLYCERIDEMIA: ICD-10-CM

## 2021-12-17 DIAGNOSIS — R73.03 PREDIABETES: Primary | ICD-10-CM

## 2021-12-17 PROCEDURE — 1036F TOBACCO NON-USER: CPT | Performed by: NURSE PRACTITIONER

## 2021-12-17 PROCEDURE — G8427 DOCREV CUR MEDS BY ELIG CLIN: HCPCS | Performed by: NURSE PRACTITIONER

## 2021-12-17 PROCEDURE — G8484 FLU IMMUNIZE NO ADMIN: HCPCS | Performed by: NURSE PRACTITIONER

## 2021-12-17 PROCEDURE — 99214 OFFICE O/P EST MOD 30 MIN: CPT | Performed by: NURSE PRACTITIONER

## 2021-12-17 PROCEDURE — G8417 CALC BMI ABV UP PARAM F/U: HCPCS | Performed by: NURSE PRACTITIONER

## 2021-12-17 ASSESSMENT — ENCOUNTER SYMPTOMS
EYE PAIN: 0
COUGH: 0
DIARRHEA: 0
TROUBLE SWALLOWING: 0
CONSTIPATION: 0
SHORTNESS OF BREATH: 0
CHEST TIGHTNESS: 0
ABDOMINAL PAIN: 0

## 2021-12-17 NOTE — PROGRESS NOTES
Subjective  Chief Complaint   Patient presents with    Discuss Labs       HPI     Pt here to follow up on labs. Labs as follows:    Lab Results   Component Value Date    WBC 8.6 12/10/2021    HGB 15.4 12/10/2021    HCT 46.9 12/10/2021     12/10/2021    CHOL 263 (H) 12/10/2021    TRIG 322 (H) 12/10/2021    HDL 47 12/10/2021    ALT 37 12/10/2021    AST 31 12/10/2021     12/10/2021    K 4.2 12/10/2021     12/10/2021    CREATININE 0.86 12/10/2021    BUN 23 (H) 12/10/2021    CO2 21 12/10/2021    LABA1C 5.8 12/10/2021     Pt admits that he has not been eating healthy or exercising d/t some depression related to recent divorce. Also had drank heavily for 3 nights prior to getting labs drawn. He is now focused on making healthier choices. 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 Past Surgical History:   Procedure Laterality Date    ANTERIOR CRUCIATE LIGAMENT REPAIR Left 2003    KNEE CARTILAGE SURGERY Left 2003    TONSILLECTOMY Bilateral 2010    WISDOM TOOTH EXTRACTION  1998    3 extracted     No family history on file.   Social History     Socioeconomic History    Marital status:      Spouse name: None    Number of children: None    Years of education: None    Highest education level: None   Occupational History    None   Tobacco Use    Smoking status: Never Smoker    Smokeless tobacco: Never Used   Substance and Sexual Activity    Alcohol use: None     Comment: rarely    Drug use: No    Sexual activity: None   Other Topics Concern    None   Social History Narrative    None     Social Determinants of Health     Financial Resource Strain: Low Risk     Difficulty of Paying Living Expenses: Not hard at all   Food Insecurity: No Food Insecurity    Worried About Running Out of Food in the Last Year: Never true    [address] in the Last Year: Never true   Transportation Needs: Unknown    Lack of Transportation (Medical): No    Lack of Transportation (Non-Medical): Not on file   Physical Activity:     Days of Exercise per Week: Not on file    Minutes of Exercise per Session: Not on file   Stress:     Feeling of Stress : Not on file   Social Connections:     Frequency of Communication with Friends and Family: Not on file    Frequency of Social Gatherings with Friends and Family: Not on file    Attends Episcopal Services: Not on file    Active Member of [address] [place] [address] [employer] or Organizations: Not on file    Attends Club or Organization Meetings: Not on file    Marital Status: Not on file   Intimate Partner Violence:     Fear of Current or Ex-Partner: Not on file    Emotionally Abused: Not on file    Physically Abused: Not on file    Sexually Abused: Not on file   Housing Stability:     Unable to Pay for Housing in the Last Year: Not on file    Number of [place] in the Last Year: Not on file    Unstable Housing in the Last Year: Not on file     No current outpatient medications on file prior to visit. No current facility-administered medications on file prior to visit. Allergies   Allergen Reactions    Ibuprofen Other (See Comments)     Pt only has 1 kidney     Penicillins Other (See Comments)     Hereditary hx    Sulphadimidine [Sulfamethazine] Other (See Comments)     Hereditary hx, all sulpha products       Review of Systems   Constitutional: Negative for activity change, appetite change and fatigue. HENT: Negative for ear pain, hearing loss and trouble swallowing. Eyes: Negative for pain and visual disturbance. Respiratory: Negative for cough, chest tightness and shortness of breath. Cardiovascular: Negative for chest pain, palpitations and leg swelling. Gastrointestinal: Negative for abdominal pain, constipation and diarrhea.    Genitourinary: Negative for difficulty urinating. Neurological: Negative for dizziness and light-headedness. Objective  Vitals:    12/17/21 0735   BP: 118/84   Site: Right Upper Arm   Position: Sitting   Cuff Size: Large Adult   Pulse: 84   Temp: 98 °F (36.7 °C)   TempSrc: Infrared   SpO2: 98%   Weight: 251 lb (113.9 kg)   Height: 6' 1\" (1.854 m)     Physical Exam  Constitutional:       General: He is not in acute distress. Appearance: Normal appearance. He is obese. He is not ill-appearing, toxic-appearing or diaphoretic. HENT:      Head: Normocephalic and atraumatic. Right Ear: External ear normal.      Left Ear: External ear normal.      Nose: Nose normal. No congestion or rhinorrhea. Eyes:      Extraocular Movements: Extraocular movements intact. Conjunctiva/sclera: Conjunctivae normal.      Pupils: Pupils are equal, round, and reactive to light. Cardiovascular:      Rate and Rhythm: Normal rate and regular rhythm. Pulses: Normal pulses. Heart sounds: Normal heart sounds. No murmur heard. Pulmonary:      Effort: Pulmonary effort is normal. No respiratory distress. Breath sounds: Normal breath sounds. No stridor. No wheezing, rhonchi or rales. Chest:      Chest wall: No tenderness. Musculoskeletal:         General: Normal range of motion. Cervical back: Normal range of motion and neck supple. No tenderness. Right lower leg: No edema. Left lower leg: No edema. Lymphadenopathy:      Cervical: No cervical adenopathy. Skin:     General: Skin is warm. Capillary Refill: Capillary refill takes less than 2 seconds. Coloration: Skin is not jaundiced. Findings: No erythema or lesion. Neurological:      General: No focal deficit present. Mental Status: He is alert and oriented to person, place, and time. Mental status is at baseline. Cranial Nerves: No cranial nerve deficit.       Coordination: Coordination normal.      Gait: Gait normal. Psychiatric:         Mood and Affect: Mood normal.         Behavior: Behavior normal.         Thought Content: Thought content normal.         Judgment: Judgment normal.         Assessment& Plan     Diagnosis Orders   1. Prediabetes  Hemoglobin A1C   2. Hypertriglyceridemia  Lipid Panel     Healthy diet, decreased alcohol consumption, avoidance of simple carbs, moderation, increase exercise. F/u in 6 months with labs. Side effects, adverse effects of the medication prescribed today, as well as treatment plan/ rationale and result expectations have been discussed with the patient who expresses understanding and desires to proceed. Close follow up to evaluate treatment results and for coordination of care. I have reviewed the patient's medical history in detail and updated the computerized patient record. As always, patient is advised that if symptoms worsen in any way they will proceed to the nearest emergency room. Orders Placed This Encounter   Procedures    Lipid Panel     Standing Status:   Future     Standing Expiration Date:   12/17/2022     Order Specific Question:   Is Patient Fasting?/# of Hours     Answer:   10    Hemoglobin A1C     Standing Status:   Future     Standing Expiration Date:   12/17/2022       No orders of the defined types were placed in this encounter. Medications Discontinued During This Encounter   Medication Reason    acetaminophen (APAP EXTRA STRENGTH) 500 MG tablet LIST CLEANUP       Return in about 6 months (around 6/17/2022) for prediabetes, lipid.     Tonya Rendon, APRN - CNP

## 2022-06-02 ENCOUNTER — HOSPITAL ENCOUNTER (OUTPATIENT)
Dept: CT IMAGING | Age: 37
Discharge: HOME | End: 2022-06-04
Payer: COMMERCIAL

## 2022-06-02 ENCOUNTER — OFFICE VISIT (OUTPATIENT)
Dept: FAMILY MEDICINE CLINIC | Age: 37
End: 2022-06-02
Payer: COMMERCIAL

## 2022-06-02 VITALS
WEIGHT: 242.4 LBS | HEART RATE: 78 BPM | DIASTOLIC BLOOD PRESSURE: 80 MMHG | HEIGHT: 73 IN | BODY MASS INDEX: 32.13 KG/M2 | TEMPERATURE: 98.1 F | OXYGEN SATURATION: 99 % | SYSTOLIC BLOOD PRESSURE: 132 MMHG

## 2022-06-02 DIAGNOSIS — R10.9 ABDOMINAL CRAMPING: Primary | ICD-10-CM

## 2022-06-02 DIAGNOSIS — R10.9 ABDOMINAL CRAMPING: ICD-10-CM

## 2022-06-02 DIAGNOSIS — R10.9 ABDOMINAL PAIN, UNSPECIFIED ABDOMINAL LOCATION: ICD-10-CM

## 2022-06-02 DIAGNOSIS — R10.817 GENERALIZED ABDOMINAL TENDERNESS WITHOUT REBOUND TENDERNESS: ICD-10-CM

## 2022-06-02 DIAGNOSIS — R63.0 DECREASED APPETITE: ICD-10-CM

## 2022-06-02 DIAGNOSIS — R19.7 DIARRHEA, UNSPECIFIED TYPE: ICD-10-CM

## 2022-06-02 DIAGNOSIS — R73.03 PREDIABETES: ICD-10-CM

## 2022-06-02 DIAGNOSIS — K76.9 LIVER LESION: Primary | ICD-10-CM

## 2022-06-02 DIAGNOSIS — E78.1 HYPERTRIGLYCERIDEMIA: ICD-10-CM

## 2022-06-02 DIAGNOSIS — R10.84 GENERALIZED ABDOMINAL PAIN: ICD-10-CM

## 2022-06-02 LAB
ALBUMIN SERPL-MCNC: 5.1 G/DL (ref 3.5–4.6)
ALP BLD-CCNC: 94 U/L (ref 35–104)
ALT SERPL-CCNC: 83 U/L (ref 0–41)
ANION GAP SERPL CALCULATED.3IONS-SCNC: 14 MEQ/L (ref 9–15)
AST SERPL-CCNC: 56 U/L (ref 0–40)
BASOPHILS ABSOLUTE: 0 K/UL (ref 0–0.2)
BASOPHILS RELATIVE PERCENT: 0.2 %
BILIRUB SERPL-MCNC: 0.5 MG/DL (ref 0.2–0.7)
BUN BLDV-MCNC: 12 MG/DL (ref 6–20)
CALCIUM SERPL-MCNC: 9.6 MG/DL (ref 8.5–9.9)
CHLORIDE BLD-SCNC: 103 MEQ/L (ref 95–107)
CHOLESTEROL, TOTAL: 215 MG/DL (ref 0–199)
CO2: 25 MEQ/L (ref 20–31)
CREAT SERPL-MCNC: 1.05 MG/DL (ref 0.7–1.2)
EOSINOPHILS ABSOLUTE: 0 K/UL (ref 0–0.7)
EOSINOPHILS RELATIVE PERCENT: 0.3 %
GFR AFRICAN AMERICAN: >60
GFR NON-AFRICAN AMERICAN: >60
GLOBULIN: 2.9 G/DL (ref 2.3–3.5)
GLUCOSE BLD-MCNC: 85 MG/DL (ref 70–99)
HBA1C MFR BLD: 5.9 % (ref 4.8–5.9)
HCT VFR BLD CALC: 44.8 % (ref 42–52)
HDLC SERPL-MCNC: 34 MG/DL (ref 40–59)
HEMOGLOBIN: 15.2 G/DL (ref 14–18)
LDL CHOLESTEROL CALCULATED: 117 MG/DL (ref 0–129)
LIPASE: 43 U/L (ref 12–95)
LYMPHOCYTES ABSOLUTE: 1.4 K/UL (ref 1–4.8)
LYMPHOCYTES RELATIVE PERCENT: 31.8 %
MCH RBC QN AUTO: 29.1 PG (ref 27–31.3)
MCHC RBC AUTO-ENTMCNC: 34 % (ref 33–37)
MCV RBC AUTO: 85.7 FL (ref 80–100)
MONOCYTES ABSOLUTE: 0.5 K/UL (ref 0.2–0.8)
MONOCYTES RELATIVE PERCENT: 12.7 %
NEUTROPHILS ABSOLUTE: 2.3 K/UL (ref 1.4–6.5)
NEUTROPHILS RELATIVE PERCENT: 55 %
PDW BLD-RTO: 12.6 % (ref 11.5–14.5)
PLATELET # BLD: 170 K/UL (ref 130–400)
POTASSIUM SERPL-SCNC: 4.4 MEQ/L (ref 3.4–4.9)
RBC # BLD: 5.23 M/UL (ref 4.7–6.1)
SODIUM BLD-SCNC: 142 MEQ/L (ref 135–144)
TOTAL PROTEIN: 8 G/DL (ref 6.3–8)
TRIGL SERPL-MCNC: 318 MG/DL (ref 0–150)
WBC # BLD: 4.3 K/UL (ref 4.8–10.8)

## 2022-06-02 PROCEDURE — 1036F TOBACCO NON-USER: CPT | Performed by: NURSE PRACTITIONER

## 2022-06-02 PROCEDURE — 74177 CT ABD & PELVIS W/CONTRAST: CPT

## 2022-06-02 PROCEDURE — 2500000003 HC RX 250 WO HCPCS: Performed by: NURSE PRACTITIONER

## 2022-06-02 PROCEDURE — 6360000004 HC RX CONTRAST MEDICATION: Performed by: NURSE PRACTITIONER

## 2022-06-02 PROCEDURE — G8417 CALC BMI ABV UP PARAM F/U: HCPCS | Performed by: NURSE PRACTITIONER

## 2022-06-02 PROCEDURE — 99214 OFFICE O/P EST MOD 30 MIN: CPT | Performed by: NURSE PRACTITIONER

## 2022-06-02 PROCEDURE — G8427 DOCREV CUR MEDS BY ELIG CLIN: HCPCS | Performed by: NURSE PRACTITIONER

## 2022-06-02 RX ORDER — DICYCLOMINE HYDROCHLORIDE 10 MG/1
10 CAPSULE ORAL 3 TIMES DAILY PRN
Qty: 30 CAPSULE | Refills: 0 | Status: SHIPPED | OUTPATIENT
Start: 2022-06-02 | End: 2022-06-14

## 2022-06-02 RX ADMIN — BARIUM SULFATE 450 ML: 20 SUSPENSION ORAL at 10:50

## 2022-06-02 RX ADMIN — IOPAMIDOL 100 ML: 612 INJECTION, SOLUTION INTRAVENOUS at 10:51

## 2022-06-02 ASSESSMENT — ENCOUNTER SYMPTOMS
COLOR CHANGE: 0
VOMITING: 0
BLOOD IN STOOL: 0
NAUSEA: 0
EYE PAIN: 0
CHEST TIGHTNESS: 0
DIARRHEA: 1
SHORTNESS OF BREATH: 0
COUGH: 0
TROUBLE SWALLOWING: 0
CONSTIPATION: 0
ABDOMINAL PAIN: 1
ABDOMINAL DISTENTION: 1

## 2022-06-02 ASSESSMENT — PATIENT HEALTH QUESTIONNAIRE - PHQ9
2. FEELING DOWN, DEPRESSED OR HOPELESS: 0
SUM OF ALL RESPONSES TO PHQ QUESTIONS 1-9: 0
SUM OF ALL RESPONSES TO PHQ9 QUESTIONS 1 & 2: 0
1. LITTLE INTEREST OR PLEASURE IN DOING THINGS: 0

## 2022-06-02 NOTE — PROGRESS NOTES
Subjective  Chief Complaint   Patient presents with    Sinusitis     stated that symptoms strared 5/22/22, covid test neg     Diarrhea    Generalized Body Aches    Chills       HPI    Pt here to discuss concerns as above. Started last Sunday 1/22-diarrhea and abdominal cramps, had eaten peanut butter that had been on recall list for possible salmonella, symptoms worsened and then he improved by Tuesday afternoon. Felt fine all day Wednesday and Thursday, but then developed headache Thursday evening, work up at midnight and went to ER. Tested for flu, covid and mono all of which were negative. Did labs which were okay other than slight elevation in monocytes. Told it was likely viral. Discharged home with phenergan. Still with on/off headache, muscle soreness mostly in legs, cough on Tuesday of this week that went away with one dose of cough medicine, on/off diarrhea, decreased appetite. Monday was better, then Tuesday was worse again. ER sent him home with order for stool cultures; however he did not complete that. Today he is still with headache, diarrhea and abdominal cramping today after not having it yesterday, no myalgia. Most symptoms have been GI related other than the one day of the cough. Denies any persistent abdominal pain or blood in the stool. Has also had chills at various times. 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 Past Surgical History:   Procedure Laterality Date    ANTERIOR CRUCIATE LIGAMENT REPAIR Left 2003    KNEE CARTILAGE SURGERY Left 2003    TONSILLECTOMY Bilateral 2010    WISDOM TOOTH EXTRACTION  1998    3 extracted     No family history on file.   Social History     Socioeconomic History    Marital status:      Spouse name: None    Number of children: None    Years of education: None    Highest education level: None   Occupational History    None   Tobacco Use    Smoking status: Never Smoker    Smokeless tobacco: Never Used   Substance and Sexual Activity    Alcohol use: None     Comment: rarely    Drug use: No    Sexual activity: None   Other Topics Concern    None   Social History Narrative    None     Social Determinants of Health     Financial Resource Strain: Low Risk     Difficulty of Paying Living Expenses: Not hard at all   Food Insecurity: No Food Insecurity    Worried About Running Out of Food in the Last Year: Never true    Megan of Food in the Last Year: Never true   Transportation Needs: Unknown    Lack of Transportation (Medical): No    Lack of Transportation (Non-Medical): Not on file   Physical Activity:     Days of Exercise per Week: Not on file    Minutes of Exercise per Session: Not on file   Stress:     Feeling of Stress : Not on file   Social Connections:     Frequency of Communication with Friends and Family: Not on file    Frequency of Social Gatherings with Friends and Family: Not on file    Attends Mandaeism Services: Not on file    Active Member of [address] [place] [address] or Organizations: Not on file    Attends Club or Organization Meetings: Not on file    Marital Status: Not on file   Intimate Partner Violence:     Fear of Current or Ex-Partner: Not on file    Emotionally Abused: Not on file    Physically Abused: Not on file    Sexually Abused: Not on file   Housing Stability:     Unable to Pay for Housing in the Last Year: Not on file    Number of [place] in the Last Year: Not on file    Unstable Housing in the Last Year: Not on file     No current outpatient medications on file prior to visit. No current facility-administered medications on file prior to visit.      Allergies   Allergen Reactions    Ibuprofen Other (See Comments)     Pt only has 1 kidney     Penicillins Other (See Comments)     Hereditary hx    Sulphadimidine [Sulfamethazine] Other (See Comments)     Hereditary hx, all sulpha products       Review of Systems   Constitutional: Positive for chills and fatigue. Negative for activity change, appetite change, diaphoresis and fever. HENT: Negative for ear pain, hearing loss and trouble swallowing. Eyes: Negative for pain and visual disturbance. Respiratory: Negative for cough, chest tightness and shortness of breath. Cardiovascular: Negative for chest pain, palpitations and leg swelling. Gastrointestinal: Positive for abdominal distention, abdominal pain and diarrhea. Negative for blood in stool, constipation, nausea and vomiting. Endocrine: Negative for polydipsia, polyphagia and polyuria. Genitourinary: Negative for difficulty urinating. Musculoskeletal: Positive for arthralgias and myalgias. Skin: Negative for color change and rash. Neurological: Negative for dizziness and light-headedness. Psychiatric/Behavioral: Negative for dysphoric mood. The patient is not nervous/anxious. Objective  Vitals:    06/02/22 0845   BP: 132/80   Site: Right Upper Arm   Position: Sitting   Cuff Size: Medium Adult   Pulse: 78   Temp: 98.1 °F (36.7 °C)   SpO2: 99%   Weight: 242 lb 6.4 oz (110 kg)   Height: 6' 1\" (1.854 m)     Physical Exam  Vitals reviewed. Constitutional:       General: He is not in acute distress. Appearance: Normal appearance. He is well-developed and normal weight. He is not diaphoretic. HENT:      Head: Normocephalic and atraumatic. Right Ear: Hearing, tympanic membrane, ear canal and external ear normal.      Left Ear: Hearing, tympanic membrane, ear canal and external ear normal.      Nose: Nose normal. No congestion or rhinorrhea. Mouth/Throat:      Mouth: Mucous membranes are moist.      Pharynx: Oropharynx is clear. No oropharyngeal exudate or posterior oropharyngeal erythema. Eyes:      General:         Right eye: No discharge.          Left eye: No discharge. Conjunctiva/sclera: Conjunctivae normal.      Pupils: Pupils are equal, round, and reactive to light. Neck:      Thyroid: No thyromegaly. Cardiovascular:      Rate and Rhythm: Normal rate and regular rhythm. Pulses: Normal pulses. Heart sounds: Normal heart sounds. No murmur heard. Pulmonary:      Effort: Pulmonary effort is normal. No respiratory distress. Breath sounds: Normal breath sounds. No stridor. No wheezing, rhonchi or rales. Chest:      Chest wall: No tenderness. Abdominal:      General: Bowel sounds are normal.      Palpations: Abdomen is soft. Tenderness: There is generalized abdominal tenderness. There is no right CVA tenderness or left CVA tenderness. Musculoskeletal:         General: Normal range of motion. Cervical back: Normal range of motion and neck supple. No muscular tenderness. Right lower leg: No edema. Left lower leg: No edema. Lymphadenopathy:      Cervical: No cervical adenopathy. Skin:     General: Skin is warm and dry. Capillary Refill: Capillary refill takes less than 2 seconds. Coloration: Skin is not jaundiced or pale. Findings: No bruising, erythema, lesion or rash. Neurological:      General: No focal deficit present. Mental Status: He is alert and oriented to person, place, and time. Mental status is at baseline. Cranial Nerves: No cranial nerve deficit. Coordination: Coordination normal.      Gait: Gait normal.   Psychiatric:         Mood and Affect: Mood normal.         Speech: Speech normal.         Behavior: Behavior normal.         Thought Content: Thought content normal.         Judgment: Judgment normal.         Assessment& Plan     Diagnosis Orders   1.  Abdominal cramping  CBC with Auto Differential    Comprehensive Metabolic Panel    Lipase    CT ABDOMEN PELVIS W IV CONTRAST Additional Contrast? Radiologist Recommendation    Gastrointestinal Panel by DNA    dicyclomine (BENTYL) 10 MG capsule   2. Abdominal pain, unspecified abdominal location  CBC with Auto Differential    Comprehensive Metabolic Panel    Lipase    CT ABDOMEN PELVIS W IV CONTRAST Additional Contrast? Radiologist Recommendation    Gastrointestinal Panel by DNA   3. Diarrhea, unspecified type  CBC with Auto Differential    Comprehensive Metabolic Panel    Lipase    CT ABDOMEN PELVIS W IV CONTRAST Additional Contrast? Radiologist Recommendation    Gastrointestinal Panel by DNA    dicyclomine (BENTYL) 10 MG capsule   4. Decreased appetite  CBC with Auto Differential    Comprehensive Metabolic Panel    Lipase    CT ABDOMEN PELVIS W IV CONTRAST Additional Contrast? Radiologist Recommendation    Gastrointestinal Panel by DNA   5. Generalized abdominal tenderness without rebound tenderness  CBC with Auto Differential    Comprehensive Metabolic Panel    Lipase    CT ABDOMEN PELVIS W IV CONTRAST Additional Contrast? Radiologist Recommendation    Gastrointestinal Panel by DNA     Check labs. Stat CT abd/pelvis. Bentyl PRN abdominal cramping/diarrhea. F/u after imaging and labs. Side effects, adverse effects of the medication prescribed today, as well as treatment plan/ rationale and result expectations have been discussed with the patient who expresses understanding and desires to proceed. Close follow up to evaluate treatment results and for coordination of care. I have reviewed the patient's medical history in detail and updated the computerized patient record. As always, patient is advised that if symptoms worsen in any way they will proceed to the nearest emergency room.        Orders Placed This Encounter   Procedures    Gastrointestinal Panel by DNA     Standing Status:   Future     Standing Expiration Date:   6/2/2023    CT ABDOMEN PELVIS W IV CONTRAST Additional Contrast? Radiologist Recommendation     Standing Status:   Future     Number of Occurrences:   1     Standing Expiration Date:   6/2/2023 Order Specific Question:   Additional Contrast?     Answer:   Radiologist Recommendation     Order Specific Question:   STAT Creatinine as needed:     Answer:   Yes     Order Specific Question:   Reason for exam:     Answer:   abd pain, tenderness, diarrhea, decreased appetite    CBC with Auto Differential     Standing Status:   Future     Standing Expiration Date:   6/2/2023    Comprehensive Metabolic Panel     Standing Status:   Future     Standing Expiration Date:   6/2/2023    Lipase     Standing Status:   Future     Standing Expiration Date:   6/2/2023       Orders Placed This Encounter   Medications    dicyclomine (BENTYL) 10 MG capsule     Sig: Take 1 capsule by mouth 3 times daily as needed (diarrhea, abdominal cramping)     Dispense:  30 capsule     Refill:  0       There are no discontinued medications. Return for as scheduled or sooner if needed.     Martin Aquino, APRN - CNP

## 2022-06-03 LAB
ADENOVIRUS F 40 41 PCR: NOT DETECTED
ASTROVIRUS PCR: NOT DETECTED
CAMPYLOBACTER PCR: NOT DETECTED
CRYPTOSPORIDIUM PCR: NOT DETECTED
CYCLOSPORA CAYETANENSIS PCR: NOT DETECTED
E COLI ENTEROAGGREGATIVE PCR: NOT DETECTED
E COLI ENTEROPATHOGENIC PCR: NOT DETECTED
E COLI ENTEROTOXIGENIC PCR: NOT DETECTED
E COLI SHIGELLA/ENTEROINVASIVE PCR: NOT DETECTED
ENTAMOEBA HISTOLYTICA PCR: NOT DETECTED
GIARDIA LAMBLIA PCR: NOT DETECTED
NOROVIRUS GI GII PCR: NOT DETECTED
PLESIOMONAS SHIGELLOIDES PCR: NOT DETECTED
ROTAVIRUS A PCR: NOT DETECTED
SALMONELLA PCR: NOT DETECTED
SAPOVIRUS PCR: NOT DETECTED
SHIGA-LIKE TOXIN-PRODUCING E. COLI (STEC) STX1/STX2: NOT DETECTED
VIBRIO CHOLERAE PCR: NOT DETECTED
VIBRIO PCR: NOT DETECTED
YERSINIA ENTEROCOLITICA PCR: NOT DETECTED

## 2022-06-07 DIAGNOSIS — R10.84 GENERALIZED ABDOMINAL PAIN: ICD-10-CM

## 2022-06-07 DIAGNOSIS — R63.0 DECREASED APPETITE: ICD-10-CM

## 2022-06-07 DIAGNOSIS — K76.9 LIVER LESION: ICD-10-CM

## 2022-06-09 ENCOUNTER — HOSPITAL ENCOUNTER (OUTPATIENT)
Dept: MRI IMAGING | Age: 37
Discharge: HOME | End: 2022-06-11
Payer: COMMERCIAL

## 2022-06-09 PROCEDURE — 6360000004 HC RX CONTRAST MEDICATION: Performed by: NURSE PRACTITIONER

## 2022-06-09 PROCEDURE — 74183 MRI ABD W/O CNTR FLWD CNTR: CPT

## 2022-06-09 PROCEDURE — A9577 INJ MULTIHANCE: HCPCS | Performed by: NURSE PRACTITIONER

## 2022-06-09 RX ADMIN — GADOBENATE DIMEGLUMINE 20 ML: 529 INJECTION, SOLUTION INTRAVENOUS at 10:33

## 2022-06-13 ENCOUNTER — OFFICE VISIT (OUTPATIENT)
Dept: GASTROENTEROLOGY | Age: 37
End: 2022-06-13
Payer: COMMERCIAL

## 2022-06-13 VITALS
WEIGHT: 244 LBS | DIASTOLIC BLOOD PRESSURE: 80 MMHG | OXYGEN SATURATION: 98 % | HEART RATE: 88 BPM | RESPIRATION RATE: 12 BRPM | BODY MASS INDEX: 32.19 KG/M2 | SYSTOLIC BLOOD PRESSURE: 116 MMHG

## 2022-06-13 DIAGNOSIS — R79.89 ABNORMAL LFTS: Primary | ICD-10-CM

## 2022-06-13 PROCEDURE — 1036F TOBACCO NON-USER: CPT | Performed by: INTERNAL MEDICINE

## 2022-06-13 PROCEDURE — G8427 DOCREV CUR MEDS BY ELIG CLIN: HCPCS | Performed by: INTERNAL MEDICINE

## 2022-06-13 PROCEDURE — 99204 OFFICE O/P NEW MOD 45 MIN: CPT | Performed by: INTERNAL MEDICINE

## 2022-06-13 PROCEDURE — G8417 CALC BMI ABV UP PARAM F/U: HCPCS | Performed by: INTERNAL MEDICINE

## 2022-06-13 ASSESSMENT — ENCOUNTER SYMPTOMS
TROUBLE SWALLOWING: 0
EYE PAIN: 0
CONSTIPATION: 0
PHOTOPHOBIA: 0
EYE REDNESS: 0
RECTAL PAIN: 0
NAUSEA: 0
VOMITING: 0
ABDOMINAL PAIN: 0
DIARRHEA: 0
COLOR CHANGE: 0
CHEST TIGHTNESS: 0
ABDOMINAL DISTENTION: 0
BLOOD IN STOOL: 0
VOICE CHANGE: 0
WHEEZING: 0
SHORTNESS OF BREATH: 0

## 2022-06-13 NOTE — PROGRESS NOTES
Subjective:      Patient ID: Lucho White is a 39 y.o. male who presents today for:  Chief Complaint   Patient presents with    New Patient    Abdominal Pain       HPI  This is a very pleasant 58-year-old who came in today for the evaluation management. Patient mentioned that he was referred to us as had abnormal imaging. Noted patient had CAT scan and MRI that shows hemangiomas. Otherwise patient mentioned that over the last 6 weeks has been feeling tired and fatigued. He  had some abdominal pain but none at this time. Reports episodes of diarrhea that seems better over the last few days and resolved. In addition to headache that seems improving. Patient works outdoors in a camp. Otherwise patient came in today for further evaluation management. Had emergency room visit that showed increased  transaminases less than 3 times normal range.   Patient came in today for the evaluation management  Past Medical History:   Diagnosis Date    Absence of kidney     [facility] Hypertension     was hypertension at one time states it has been down for the past 15 years w/o medication     Past Surgical History:   Procedure Laterality Date    ANTERIOR CRUCIATE LIGAMENT REPAIR Left 2003    KNEE CARTILAGE SURGERY Left 2003    TONSILLECTOMY Bilateral 2010    WISDOM TOOTH EXTRACTION  1998    3 extracted     Social History     Socioeconomic History    Marital status:      Spouse name: Not on file    Number of children: Not on file    Years of education: Not on file    Highest education level: Not on file   Occupational History    Not on file   Tobacco Use    Smoking status: Never Smoker    Smokeless tobacco: Never Used   Substance and Sexual Activity    Alcohol use: Not on file     Comment: rarely    Drug use: No    Sexual activity: Not on file   Other Topics Concern    Not on file   Social History Narrative    Not on file     Social Determinants of Health     Financial Resource Strain: Low Risk Negative for abdominal distention, abdominal pain, blood in stool, constipation, diarrhea, nausea, rectal pain and vomiting. Endocrine: Negative for polydipsia, polyphagia and polyuria. Genitourinary: Negative for difficulty urinating and hematuria. Skin: Negative for color change, pallor and rash. Neurological: Negative for dizziness, speech difficulty and headaches. Psychiatric/Behavioral: Negative for confusion and suicidal ideas. Objective:   /80 (Site: Right Upper Arm, Position: Sitting, Cuff Size: Large Adult)   Pulse 88   Resp 12   Wt 244 lb (110.7 kg)   SpO2 98%   BMI 32.19 kg/m²     Physical Exam  Constitutional:       General: He is not in acute distress. Appearance: He is well-developed. HENT:      Head: Normocephalic and atraumatic. Eyes:      Conjunctiva/sclera: Conjunctivae normal.      Pupils: Pupils are equal, round, and reactive to light. Cardiovascular:      Rate and Rhythm: Normal rate and regular rhythm. Heart sounds: Normal heart sounds. Pulmonary:      Effort: Pulmonary effort is normal. No respiratory distress. Breath sounds: Normal breath sounds. No wheezing or rales. Abdominal:      General: Bowel sounds are normal. There is no distension. Palpations: Abdomen is soft. Abdomen is not rigid. There is no hepatomegaly, splenomegaly or mass. Tenderness: There is no abdominal tenderness. There is no guarding or rebound. Musculoskeletal:         General: No tenderness or deformity. Normal range of motion. Cervical back: Neck supple. Skin:     Coloration: Skin is not pale. Findings: No erythema or rash. Neurological:      Mental Status: He is alert and oriented to person, place, and time.          Laboratory, Pathology, Radiology reviewed in detail with relevantimportant investigations summarized below:  Lab Results   Component Value Date    WBC 4.3 06/02/2022    WBC 8.6 12/10/2021    HGB 15.2 06/02/2022    HGB 15.4 12/10/2021    HCT 44.8 06/02/2022    HCT 46.9 12/10/2021    MCV 85.7 06/02/2022    MCV 87.8 12/10/2021     06/02/2022     12/10/2021    . Lab Results   Component Value Date    ALT 83 06/02/2022    ALT 37 12/10/2021    AST 56 06/02/2022    AST 31 12/10/2021    ALKPHOS 94 06/02/2022    ALKPHOS 101 12/10/2021    BILITOT 0.5 06/02/2022    BILITOT 0.3 12/10/2021       MRI ABDOMEN W WO CONTRAST    Result Date: 6/10/2022  EXAMINATION: MRI ABDOMEN W WO CONTRAST DATE AND TIME:6/9/2022 9:20 AM CLINICAL HISTORY: K76.9 Liver lesion ICD10 COMPARISON: None TECHNIQUE:Multiplanar, multi-pulse sequence MRI of the abdomen without intravenous contrast). Pulse sequences included but not limited to: axial in- and out of phase, coronal 2-D FIESTA, coronal fat-saturation T2, axial T2 without and with fat-saturation, axial fat-suppressed T1. 20 cc of gadolinium contrast administered intravenously. FINDINGS:      Liver:  Dominant 3.4 cm right hepatic lesion consistent with hemangioma. Multiple additional smaller (5-7 mm) hepatic lesions consistent with small hemangiomas with one 7 mm lesion in the left hepatic lobe more consistent with a small incidental hepatic cyst. These are all benign appearing lesions. Gallbladder: There are no filling defects in the gallbladder to suggest gallstones. There is no MR evidence of acute cholecystitis. There is no biliary ductal dilation. Pancreas: The pancreas has normal signal intensity with no mass or ductal dilatation. Spleen: Mild splenomegaly. Adrenals: Normal      Kidneys: Left kidney unremarkable. No right kidney identified. Bowel: The included small bowel and colonic loops are all normal in caliber and wall thickness. No evidence of small bowel obstruction. Other findings: There is no abdominal adenopathy, including imaged retroperitoneum. No free fluid or fluid collection. Normal caliber aorta.      MULTIPLE HEPATIC LESIONS CONSISTENT WITH HEMANGIOMAS. PROBABLE ADDITIONAL 7 MM LEFT HEPATIC CYST. CT ABDOMEN PELVIS W IV CONTRAST Additional Contrast? Radiologist Recommendation    Result Date: 6/2/2022  EXAMINATION: CT Abdomen and Pelvis with contrast. CLINICAL HISTORY: R10.9 Abdominal cramping ICD10. Diarrhea and decreased appetite. BMI: 32 kg/m-2 COMPARISONS: CT chest: 8/16/2017 TECHNIQUE: Contiguous axial images were obtained through the abdomen and pelvis with contrast enhancement. 100 mL of Isovue 300 were given intravenously. Coronal and sagittal reformations were made. FINDINGS: Lung bases are clear. The spleen is enlarged measuring up to 16.7 cm in length with a volume of 620 mL. The pancreas, and adrenal glands have normal morphology. The gallbladder is partially distended and unremarkable. Within the right lobe of the liver in the VIII segment there is an oval-shaped lesion measures approximately 3.6 x 2.5 x 3.3 cm and has peripheral globular enhancement. The enhancement pattern suggestive of a hemangioma. This was not seen on prior CT scan. Also there are several  other smaller lesions within other segments of the liver. The all measure less than a centimeter in diameter. Multiple lesions are suspicious for metastatic disease. Consider correlation with enhanced MRI for further evaluation. The patient has history of only one kidney. There is a hypertrophied left kidney noted. No evidence of renal mass or hydronephrosis all. No nephrolithiasis. Ureters not dilated. Bladder is partially distended and unremarkable. No retroperitoneal fluid collection or lymphadenopathy. No aortic aneurysm. There is a small right indirect inguinal hernia containing fat. There is no duodenal or small bowel dilatation or focal wall thickening. Appendix is normal.  There are few diverticula in the sigmoid and transverse colon. There are no stranding changes in the mesentery to suggest diverticulitis.   No fluid collection, free fluid, or free air. Vertebral heights are maintained. There are bilateral pars defects at L5 level with adjacent sclerosis. THERE ARE 7 LESIONS WITHIN THE LIVER WITH 6 LESIONS MEASURING LESS THAN A CENTIMETER IN DIAMETER AND THE OTHER LESION MEASURING 3.6 CM IN DIAMETER. MULTIPLE LIVER LESIONS ARE WORRISOME FOR POSSIBLE METASTATIC DISEASE. THE LARGER LESION HAS A ENHANCEMENT PATTERN SUGGESTIVE OF A HEMANGIOMA. RECOMMEND CORRELATION WITH ENHANCED MRI FOR FURTHER EVALUATION. NO BOWEL DILATATION. NO ADENOPATHY OR ASCITES. THERE ARE FEW DIVERTICULA WITHIN THE COLON. SMALL RIGHT INGUINAL HERNIA CONTAINING FAT. NO PARS DEFECTS AT L5. All CT scans at this facility use dose modulation, iterative reconstruction, and/or weight based dosing when appropriate to reduce radiation dose to as low as reasonably achievable. No results found for: IRON, TIBC, FERRITIN  No results found for: INR  No components found for: ACUTEHEPATITISSCREEN  No components found for: CELIACPANEL  No components found for: STOOLCULTURE, C.DIFF, STOOLOVAPARASITE, STOOLLEUCOCYTE        Assessment:       Diagnosis Orders   1.  Abnormal LFTs  Hepatitis B Core Antibody, Total    Hepatitis A Antibody, Total    Hepatitis A Antibody, IgM    Hepatitis C Antibody    Hepatitis B Surface Antigen    Hepatitis B Surface Antibody    Hepatic Function Panel    HIV Screen    Lyme Disease Acute Reflexive Panel    CBC with Auto Differential    Lyme Disease Chronic Reflexive Panel    Celiac Disease Panel         Plan:      Orders Placed This Encounter   Procedures    Hepatitis B Core Antibody, Total     Standing Status:   Future     Standing Expiration Date:   6/13/2023    Hepatitis A Antibody, Total     Standing Status:   Future     Standing Expiration Date:   6/13/2023    Hepatitis A Antibody, IgM     Standing Status:   Future     Standing Expiration Date:   6/13/2023    Hepatitis C Antibody     Standing Status:   Future     Standing Expiration Date: 12/13/2022    Hepatitis B Surface Antigen     Standing Status:   Future     Standing Expiration Date:   12/13/2022    Hepatitis B Surface Antibody     Standing Status:   Future     Standing Expiration Date:   6/13/2023    Hepatic Function Panel     Standing Status:   Future     Standing Expiration Date:   6/13/2023    HIV Screen     Standing Status:   Future     Standing Expiration Date:   6/13/2023    Lyme Disease Acute Reflexive Panel     Standing Status:   Future     Standing Expiration Date:   6/13/2023    CBC with Auto Differential     Standing Status:   Future     Standing Expiration Date:   6/13/2023    Lyme Disease Chronic Reflexive Panel     Standing Status:   Future     Standing Expiration Date:   6/13/2023    Celiac Disease Panel     Standing Status:   Future     Standing Expiration Date:   6/13/2023     No orders of the defined types were placed in this encounter. Assessment and plan   1. Abnormal CT imaging:  Patient had MRI that showed multiple hepatic lesions consistent with hemangiomas. The largest hemangioma was noted at the right hepatic lobe around 3.4 cm. May consider repeat imaging to assess stability. 2-Abnormal liver enzymes:  Patient reports that he  been having on and off abdominal pain and diarrhea in addition to headache over the last 6 weeks or so. The abdominal pain and diarrhea resolved at this time. Had stool studies that were negative  Patient does endorse persistent fatigue and tiredness. At this time, we will proceed with etiology work-up. Will obtain initial hepatotoxic varices as well as Lyme disease given the occupation  Proceed with further work-up pending clinical course, initial follow-up and repeat lab testing  3-Associated medical conditions include history of single kidney at birth, hypertension      Return in about 4 weeks (around 7/11/2022) for Post procedure results discussion, further management.       Domenic Pacheco MD

## 2022-06-14 ENCOUNTER — OFFICE VISIT (OUTPATIENT)
Dept: FAMILY MEDICINE CLINIC | Age: 37
End: 2022-06-14
Payer: COMMERCIAL

## 2022-06-14 VITALS
HEIGHT: 73 IN | HEART RATE: 74 BPM | WEIGHT: 246 LBS | DIASTOLIC BLOOD PRESSURE: 82 MMHG | BODY MASS INDEX: 32.6 KG/M2 | OXYGEN SATURATION: 99 % | SYSTOLIC BLOOD PRESSURE: 118 MMHG

## 2022-06-14 DIAGNOSIS — R79.89 ABNORMAL LFTS: ICD-10-CM

## 2022-06-14 DIAGNOSIS — R53.83 FATIGUE, UNSPECIFIED TYPE: ICD-10-CM

## 2022-06-14 DIAGNOSIS — R79.89 ELEVATED LFTS: ICD-10-CM

## 2022-06-14 DIAGNOSIS — R53.83 FATIGUE, UNSPECIFIED TYPE: Primary | ICD-10-CM

## 2022-06-14 DIAGNOSIS — R10.84 GENERALIZED ABDOMINAL PAIN: ICD-10-CM

## 2022-06-14 DIAGNOSIS — R73.03 PREDIABETES: ICD-10-CM

## 2022-06-14 DIAGNOSIS — K76.9 LIVER LESION: ICD-10-CM

## 2022-06-14 LAB
ALBUMIN SERPL-MCNC: 5.1 G/DL (ref 3.5–4.6)
ALP BLD-CCNC: 88 U/L (ref 35–104)
ALT SERPL-CCNC: 46 U/L (ref 0–41)
AST SERPL-CCNC: 29 U/L (ref 0–40)
BASOPHILS ABSOLUTE: 0 K/UL (ref 0–0.2)
BASOPHILS RELATIVE PERCENT: 0.7 %
BILIRUB SERPL-MCNC: 0.3 MG/DL (ref 0.2–0.7)
BILIRUBIN DIRECT: <0.2 MG/DL (ref 0–0.4)
BILIRUBIN, INDIRECT: ABNORMAL MG/DL (ref 0–0.6)
EOSINOPHILS ABSOLUTE: 0.1 K/UL (ref 0–0.7)
EOSINOPHILS RELATIVE PERCENT: 1.1 %
HCT VFR BLD CALC: 43.5 % (ref 42–52)
HEMOGLOBIN: 14.9 G/DL (ref 14–18)
HEPATITIS B SURFACE ANTIGEN INTERPRETATION: NORMAL
LYMPHOCYTES ABSOLUTE: 1.3 K/UL (ref 1–4.8)
LYMPHOCYTES RELATIVE PERCENT: 22.5 %
MCH RBC QN AUTO: 29.6 PG (ref 27–31.3)
MCHC RBC AUTO-ENTMCNC: 34.3 % (ref 33–37)
MCV RBC AUTO: 86.3 FL (ref 80–100)
MONOCYTES ABSOLUTE: 0.6 K/UL (ref 0.2–0.8)
MONOCYTES RELATIVE PERCENT: 10.3 %
NEUTROPHILS ABSOLUTE: 3.8 K/UL (ref 1.4–6.5)
NEUTROPHILS RELATIVE PERCENT: 65.4 %
PDW BLD-RTO: 12.7 % (ref 11.5–14.5)
PLATELET # BLD: 240 K/UL (ref 130–400)
RBC # BLD: 5.04 M/UL (ref 4.7–6.1)
TOTAL PROTEIN: 7.5 G/DL (ref 6.3–8)
TSH REFLEX: 1 UIU/ML (ref 0.44–3.86)
WBC # BLD: 5.8 K/UL (ref 4.8–10.8)

## 2022-06-14 PROCEDURE — 1036F TOBACCO NON-USER: CPT | Performed by: NURSE PRACTITIONER

## 2022-06-14 PROCEDURE — G8417 CALC BMI ABV UP PARAM F/U: HCPCS | Performed by: NURSE PRACTITIONER

## 2022-06-14 PROCEDURE — 99214 OFFICE O/P EST MOD 30 MIN: CPT | Performed by: NURSE PRACTITIONER

## 2022-06-14 PROCEDURE — G8427 DOCREV CUR MEDS BY ELIG CLIN: HCPCS | Performed by: NURSE PRACTITIONER

## 2022-06-14 ASSESSMENT — ENCOUNTER SYMPTOMS
EYE PAIN: 0
CHEST TIGHTNESS: 0
CONSTIPATION: 0
DIARRHEA: 1
ABDOMINAL PAIN: 1
SHORTNESS OF BREATH: 0
TROUBLE SWALLOWING: 0
COUGH: 0
COLOR CHANGE: 0

## 2022-06-14 NOTE — PROGRESS NOTES
Subjective  Chief Complaint   Patient presents with    6 Month Follow-Up     prediabetes, lipids    Abdominal Pain     still having pain, found out liver lesions are benign    Fatigue     states that he has no energy, GI told him he may have something viral. doing more BW today. HPI    Check up. Was seen 2 weeks ago for diarrhea/abdominal pain. Still with intermittent diarrhea. Headaches seem to be improving. Cough is gone. Muscle pain is gone. Does have significant fatigue. Appetite has come back some. Had CT which showed hepatic lesions, had f/u MRI which showed hemangiomas. Abdominal pain still as frequent, but not as intense. Per Dr. Benjamin Villafana, he believes this has all been something viral. Has labs to be completed today to rule out hepatits, HIV, lyme. Will f/u with him in 3 weeks. Prediabetes-has cut out all pop since December, cut out sweets, cut way back on alcohol consumption. Was feeling better prior to his current illness. Did a fitness challenge through work and hiked over 70 miles in the month of March. 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 Past Surgical History:   Procedure Laterality Date    ANTERIOR CRUCIATE LIGAMENT REPAIR Left 2003    KNEE CARTILAGE SURGERY Left 2003    TONSILLECTOMY Bilateral 2010    WISDOM TOOTH EXTRACTION  1998    3 extracted     No family history on file.   Social History     Socioeconomic History    Marital status:      Spouse name: None    Number of children: None    Years of education: None    Highest education level: None   Occupational History    None   Tobacco Use    Smoking status: Never Smoker    Smokeless tobacco: Never Used   Substance and Sexual Activity    Alcohol use: None     Comment: rarely    Drug use: No    Sexual activity: None   Other Topics Concern    None   Social History Narrative    None     Social Determinants of Health     Financial Resource Strain: Low Risk     Difficulty of Paying Living Expenses: Not hard at all   Food Insecurity: No Food Insecurity    Worried About Running Out of Food in the Last Year: Never true    Megan of Food in the Last Year: Never true   Transportation Needs: Unknown    Lack of Transportation (Medical): No    Lack of Transportation (Non-Medical): Not on file   Physical Activity:     Days of Exercise per Week: Not on file    Minutes of Exercise per Session: Not on file   Stress:     Feeling of Stress : Not on file   Social Connections:     Frequency of Communication with Friends and Family: Not on file    Frequency of Social Gatherings with Friends and Family: Not on file    Attends Faith Services: Not on file    Active Member of [address] [place] [address] or Organizations: Not on file    Attends Club or Organization Meetings: Not on file    Marital Status: Not on file   Intimate Partner Violence:     Fear of Current or Ex-Partner: Not on file    Emotionally Abused: Not on file    Physically Abused: Not on file    Sexually Abused: Not on file   Housing Stability:     Unable to Pay for Housing in the Last Year: Not on file    Number of [place] in the Last Year: Not on file    Unstable Housing in the Last Year: Not on file     No current outpatient medications on file prior to visit. No current facility-administered medications on file prior to visit. Allergies   Allergen Reactions    Ibuprofen Other (See Comments)     Pt only has 1 kidney     Penicillins Other (See Comments)     Hereditary hx    Sulphadimidine [Sulfamethazine] Other (See Comments)     Hereditary hx, all sulpha products       Review of Systems   Constitutional: Positive for fatigue. Negative for activity change, appetite change, chills, diaphoresis and fever.    HENT: Negative for ear pain, hearing loss and trouble swallowing. Eyes: Negative for pain and visual disturbance. Respiratory: Negative for cough, chest tightness and shortness of breath. Cardiovascular: Negative for chest pain, palpitations and leg swelling. Gastrointestinal: Positive for abdominal pain and diarrhea. Negative for constipation. Endocrine: Negative for polydipsia, polyphagia and polyuria. Genitourinary: Negative for difficulty urinating. Musculoskeletal: Negative for arthralgias and myalgias. Skin: Negative for color change and rash. Neurological: Negative for dizziness and light-headedness. Psychiatric/Behavioral: Negative for dysphoric mood. The patient is not nervous/anxious. Objective  Vitals:    06/14/22 0820   BP: 118/82   Site: Left Upper Arm   Position: Sitting   Cuff Size: Medium Adult   Pulse: 74   SpO2: 99%   Weight: 246 lb (111.6 kg)   Height: 6' 1\" (1.854 m)     Physical Exam  Constitutional:       General: He is not in acute distress. Appearance: Normal appearance. He is obese. He is not ill-appearing, toxic-appearing or diaphoretic. HENT:      Head: Normocephalic and atraumatic. Right Ear: External ear normal.      Left Ear: External ear normal.      Nose: Nose normal. No congestion or rhinorrhea. Eyes:      Extraocular Movements: Extraocular movements intact. Conjunctiva/sclera: Conjunctivae normal.      Pupils: Pupils are equal, round, and reactive to light. Cardiovascular:      Rate and Rhythm: Normal rate and regular rhythm. Pulses: Normal pulses. Heart sounds: Normal heart sounds. No murmur heard. Pulmonary:      Effort: Pulmonary effort is normal. No respiratory distress. Breath sounds: Normal breath sounds. No stridor. No wheezing, rhonchi or rales. Chest:      Chest wall: No tenderness. Musculoskeletal:         General: Normal range of motion. Cervical back: Normal range of motion and neck supple. No tenderness.       Right lower leg: No edema. Left lower leg: No edema. Lymphadenopathy:      Cervical: No cervical adenopathy. Skin:     General: Skin is warm. Capillary Refill: Capillary refill takes less than 2 seconds. Coloration: Skin is not jaundiced. Findings: No erythema or lesion. Neurological:      General: No focal deficit present. Mental Status: He is alert and oriented to person, place, and time. Mental status is at baseline. Cranial Nerves: No cranial nerve deficit. Coordination: Coordination normal.      Gait: Gait normal.   Psychiatric:         Mood and Affect: Mood normal.         Behavior: Behavior normal.         Thought Content: Thought content normal.         Judgment: Judgment normal.         Assessment& Plan     Diagnosis Orders   1. Fatigue, unspecified type  TSH with Reflex   2. Prediabetes     3. Generalized abdominal pain     4. Liver lesion     5. Elevated LFTs       Check labs as ordered by Dr. Abril Olmos. Will also check TSH. Discussed gradually increasing physical activity level as tolerated. F/u in 4 weeks or sooner PRN. Side effects, adverse effects of the medication prescribed today, as well as treatment plan/ rationale and result expectations have been discussed with the patient who expresses understanding and desires to proceed. Close follow up to evaluate treatment results and for coordination of care. I have reviewed the patient's medical history in detail and updated the computerized patient record. As always, patient is advised that if symptoms worsen in any way they will proceed to the nearest emergency room. Orders Placed This Encounter   Procedures    TSH with Reflex     Standing Status:   Future     Number of Occurrences:   1     Standing Expiration Date:   6/14/2023       No orders of the defined types were placed in this encounter.       Medications Discontinued During This Encounter   Medication Reason    dicyclomine (BENTYL) 10 MG capsule Patient Choice       Return in about 4 weeks (around 7/12/2022) for f/u fatigue, abdominal pain, elevated lfts.     Petey Marker, APRN - CNP

## 2022-06-15 LAB
HAV IGM SER IA-ACNC: NONREACTIVE
HBV SURFACE AB TITR SER: <3.5 MIU/ML
HEPATITIS C ANTIBODY: NONREACTIVE
HIV AG/AB: NONREACTIVE

## 2022-06-16 LAB
HAV AB SERPL IA-ACNC: NEGATIVE
HEPATITIS B CORE TOTAL ANTIBODY: NEGATIVE
LYME, EIA: 0.2 LIV (ref 0–1.2)

## 2022-06-17 LAB — CELIAC PANEL: 6 UNITS (ref 0–19)

## 2022-06-27 ENCOUNTER — OFFICE VISIT (OUTPATIENT)
Dept: GASTROENTEROLOGY | Age: 37
End: 2022-06-27
Payer: COMMERCIAL

## 2022-06-27 VITALS
HEART RATE: 82 BPM | BODY MASS INDEX: 32.06 KG/M2 | DIASTOLIC BLOOD PRESSURE: 72 MMHG | OXYGEN SATURATION: 97 % | SYSTOLIC BLOOD PRESSURE: 110 MMHG | WEIGHT: 243 LBS

## 2022-06-27 DIAGNOSIS — R79.89 ABNORMAL LFTS: Primary | ICD-10-CM

## 2022-06-27 PROCEDURE — 99214 OFFICE O/P EST MOD 30 MIN: CPT | Performed by: INTERNAL MEDICINE

## 2022-06-27 PROCEDURE — G8417 CALC BMI ABV UP PARAM F/U: HCPCS | Performed by: INTERNAL MEDICINE

## 2022-06-27 PROCEDURE — 1036F TOBACCO NON-USER: CPT | Performed by: INTERNAL MEDICINE

## 2022-06-27 PROCEDURE — G8427 DOCREV CUR MEDS BY ELIG CLIN: HCPCS | Performed by: INTERNAL MEDICINE

## 2022-06-27 ASSESSMENT — ENCOUNTER SYMPTOMS
ABDOMINAL PAIN: 1
EYE REDNESS: 0
TROUBLE SWALLOWING: 0
VOICE CHANGE: 0
CONSTIPATION: 0
COLOR CHANGE: 0
SHORTNESS OF BREATH: 0
NAUSEA: 0
RECTAL PAIN: 0
VOMITING: 0
DIARRHEA: 1
EYE PAIN: 0
CHEST TIGHTNESS: 0
WHEEZING: 0
ABDOMINAL DISTENTION: 0
BLOOD IN STOOL: 0
PHOTOPHOBIA: 0

## 2022-06-27 NOTE — PROGRESS NOTES
Subjective:      Patient ID: Hiro Milian is a 39 y.o. male who presents today for:  Chief Complaint   Patient presents with    Follow-up       HPI   Since last visit, patient had initial viral studies that were negative. Reports intermittent fatigue and tiredness. Headache symptoms had resolved. Endorses watery stool every third day. Described abdominal pain intermittent in nature when it occurs either is on the left lower quadrant left upper quadrant or periumbilical.  Otherwise patient came in today for further evaluation. Since last visit, had liver function test that shows improvement transaminases. Patient came in today for the evaluation management  Background  This is a very pleasant 69-year-old who came in today for the evaluation management. Patient mentioned that he was referred to us as had abnormal imaging. Noted patient had CAT scan and MRI that shows hemangiomas. Otherwise patient mentioned that over the last 6 weeks has been feeling tired and fatigued. He  had some abdominal pain but none at this time. Reports episodes of diarrhea that seems better over the last few days and resolved. In addition to headache that seems improving. Patient works outdoors in a camp. Otherwise patient came in today for further evaluation management. Had emergency room visit that showed increased  transaminases less than 3 times normal range.   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   [facility] Hypertension     was hypertension at one time states it has been down for the past 15 years w/o medication     Past Surgical History:   Procedure Laterality Date    ANTERIOR CRUCIATE LIGAMENT REPAIR Left 2003    KNEE CARTILAGE SURGERY Left 2003    TONSILLECTOMY Bilateral 2010    WISDOM TOOTH EXTRACTION  1998    3 extracted     Social History     Socioeconomic History    Marital status:      Spouse name: Not on file    Number of children: Not on file    Years of education: Not on file    Highest education level: Not on file   Occupational History    Not on file   Tobacco Use    Smoking status: Never Smoker    Smokeless tobacco: Never Used   Substance and Sexual Activity    Alcohol use: Not on file     Comment: rarely    Drug use: No    Sexual activity: Not on file   Other Topics Concern    Not on file   Social History Narrative    Not on file     Social Determinants of Health     Financial Resource Strain: Low Risk     Difficulty of Paying Living Expenses: Not hard at all   Food Insecurity: No Food Insecurity    Worried About [address] in the Last Year: Never true    [facility] in the Last Year: Never true   Transportation Needs: Unknown    Lack of Transportation (Medical): No    Lack of Transportation (Non-Medical): Not on file   Physical Activity:     Days of Exercise per Week: Not on file    Minutes of Exercise per Session: Not on file   Stress:     Feeling of Stress : Not on file   Social Connections:     Frequency of Communication with Friends and Family: Not on file    Frequency of Social Gatherings with Friends and Family: Not on file    Attends Voodoo Services: Not on file    Active Member of [address] [place] [address] or Organizations: Not on file    Attends Club or Organization Meetings: Not on file    Marital Status: Not on file   Intimate Partner Violence:     Fear of Current or Ex-Partner: Not on file    Emotionally Abused: Not on file    Physically Abused: Not on file    Sexually Abused: Not on file   Housing Stability:     Unable to Pay for Housing in the Last Year: Not on file    Number of [place] in the Last Year: Not on file    Unstable Housing in the Last Year: Not on file     No family history on file.   Allergies   Allergen Reactions    Ibuprofen Other (See Comments)     Pt only has 1 kidney     Penicillins Other (See Comments)     Hereditary hx    Sulphadimidine [Sulfamethazine] Other (See Comments)     Hereditary hx, all sulpha products         Review of Systems   Constitutional: Negative for appetite change, chills, fatigue, fever and unexpected weight change. HENT: Negative for nosebleeds, tinnitus, trouble swallowing and voice change. Eyes: Negative for photophobia, pain and redness. Respiratory: Negative for chest tightness, shortness of breath and wheezing. Cardiovascular: Negative for chest pain, palpitations and leg swelling. Gastrointestinal: Positive for abdominal pain and diarrhea. Negative for abdominal distention, blood in stool, constipation, nausea, rectal pain and vomiting. Endocrine: Negative for polydipsia, polyphagia and polyuria. Genitourinary: Negative for difficulty urinating and hematuria. Skin: Negative for color change, pallor and rash. Neurological: Negative for dizziness, speech difficulty and headaches. Psychiatric/Behavioral: Negative for confusion and suicidal ideas. Objective:   /72 (Site: Right Upper Arm, Position: Sitting, Cuff Size: Large Adult)   Pulse 82   Wt 243 lb (110.2 kg)   SpO2 97%   BMI 32.06 kg/m²     Physical Exam    Laboratory, Pathology, Radiology reviewed in detail with relevantimportant investigations summarized below:  Lab Results   Component Value Date    WBC 5.8 06/14/2022    WBC 4.3 06/02/2022    WBC 8.6 12/10/2021    HGB 14.9 06/14/2022    HGB 15.2 06/02/2022    HGB 15.4 12/10/2021    HCT 43.5 06/14/2022    HCT 44.8 06/02/2022    HCT 46.9 12/10/2021    MCV 86.3 06/14/2022    MCV 85.7 06/02/2022    MCV 87.8 12/10/2021     06/14/2022     06/02/2022     12/10/2021    .   Lab Results   Component Value Date    ALT 46 06/14/2022    ALT 83 06/02/2022    ALT 37 12/10/2021    AST 29 06/14/2022    AST 56 06/02/2022    AST 31 12/10/2021    ALKPHOS 88 06/14/2022    ALKPHOS 94 06/02/2022    ALKPHOS 101 12/10/2021    BILITOT 0.3 06/14/2022    BILITOT 0.5 06/02/2022    BILITOT 0.3 12/10/2021       MRI ABDOMEN W WO CONTRAST    Result Date: 6/10/2022  EXAMINATION: MRI ABDOMEN W WO CONTRAST DATE AND TIME:6/9/2022 9:20 AM CLINICAL HISTORY: K76.9 Liver lesion ICD10 COMPARISON: None TECHNIQUE:Multiplanar, multi-pulse sequence MRI of the abdomen without intravenous contrast). Pulse sequences included but not limited to: axial in- and out of phase, coronal 2-D FIESTA, coronal fat-saturation T2, axial T2 without and with fat-saturation, axial fat-suppressed T1. 20 cc of gadolinium contrast administered intravenously. FINDINGS:      Liver:  Dominant 3.4 cm right hepatic lesion consistent with hemangioma. Multiple additional smaller (5-7 mm) hepatic lesions consistent with small hemangiomas with one 7 mm lesion in the left hepatic lobe more consistent with a small incidental hepatic cyst. These are all benign appearing lesions. Gallbladder: There are no filling defects in the gallbladder to suggest gallstones. There is no MR evidence of acute cholecystitis. There is no biliary ductal dilation. Pancreas: The pancreas has normal signal intensity with no mass or ductal dilatation. Spleen: Mild splenomegaly. Adrenals: Normal      Kidneys: Left kidney unremarkable. No right kidney identified. Bowel: The included small bowel and colonic loops are all normal in caliber and wall thickness. No evidence of small bowel obstruction. Other findings: There is no abdominal adenopathy, including imaged retroperitoneum. No free fluid or fluid collection. Normal caliber aorta. MULTIPLE HEPATIC LESIONS CONSISTENT WITH HEMANGIOMAS. PROBABLE ADDITIONAL 7 MM LEFT HEPATIC CYST. CT ABDOMEN PELVIS W IV CONTRAST Additional Contrast? Radiologist Recommendation    Result Date: 6/2/2022  EXAMINATION: CT Abdomen and Pelvis with contrast. CLINICAL HISTORY: R10.9 Abdominal cramping ICD10. Diarrhea and decreased appetite.  BMI: 32 kg/m-2 COMPARISONS: CT chest: 8/16/2017 TECHNIQUE: Contiguous axial images were obtained through the abdomen and pelvis with contrast enhancement. 100 mL of Isovue 300 were given intravenously. Coronal and sagittal reformations were made. FINDINGS: Lung bases are clear. The spleen is enlarged measuring up to 16.7 cm in length with a volume of 620 mL. The pancreas, and adrenal glands have normal morphology. The gallbladder is partially distended and unremarkable. Within the right lobe of the liver in the VIII segment there is an oval-shaped lesion measures approximately 3.6 x 2.5 x 3.3 cm and has peripheral globular enhancement. The enhancement pattern suggestive of a hemangioma. This was not seen on prior CT scan. Also there are several  other smaller lesions within other segments of the liver. The all measure less than a centimeter in diameter. Multiple lesions are suspicious for metastatic disease. Consider correlation with enhanced MRI for further evaluation. The patient has history of only one kidney. There is a hypertrophied left kidney noted. No evidence of renal mass or hydronephrosis all. No nephrolithiasis. Ureters not dilated. Bladder is partially distended and unremarkable. No retroperitoneal fluid collection or lymphadenopathy. No aortic aneurysm. There is a small right indirect inguinal hernia containing fat. There is no duodenal or small bowel dilatation or focal wall thickening. Appendix is normal.  There are few diverticula in the sigmoid and transverse colon. There are no stranding changes in the mesentery to suggest diverticulitis. No fluid collection, free fluid, or free air. Vertebral heights are maintained. There are bilateral pars defects at L5 level with adjacent sclerosis. THERE ARE 7 LESIONS WITHIN THE LIVER WITH 6 LESIONS MEASURING LESS THAN A CENTIMETER IN DIAMETER AND THE OTHER LESION MEASURING 3.6 CM IN DIAMETER. MULTIPLE LIVER LESIONS ARE WORRISOME FOR POSSIBLE METASTATIC DISEASE.  THE LARGER LESION HAS A ENHANCEMENT PATTERN SUGGESTIVE OF A HEMANGIOMA. RECOMMEND CORRELATION WITH ENHANCED MRI FOR FURTHER EVALUATION. NO BOWEL DILATATION. NO ADENOPATHY OR ASCITES. THERE ARE FEW DIVERTICULA WITHIN THE COLON. SMALL RIGHT INGUINAL HERNIA CONTAINING FAT. NO PARS DEFECTS AT L5. All CT scans at this facility use dose modulation, iterative reconstruction, and/or weight based dosing when appropriate to reduce radiation dose to as low as reasonably achievable. No results found for: IRON, TIBC, FERRITIN  No results found for: INR  No components found for: ACUTEHEPATITISSCREEN  No components found for: CELIACPANEL  No components found for: STOOLCULTURE, C.DIFF, STOOLOVAPARASITE, STOOLLEUCOCYTE        Assessment:    1. Hepatic hemangiomas:  Noted recent MRI that shows multiple hepatic lesion consider hemangiomas. The large hemangioma was on the right hepatic lobe around 3.4 cm. May consider repeat imaging to assess stability. 2- Abnormal liver enzymes:  Noted improvement of transaminases since the last blood work. Patient mentioned headache symptoms has resolved. Still intermittent abdominal pain and diarrhea though less frequent  And initial testing that was negative for viral hepatitis. Will continue liver work-up at this time. Celiac disease and Lyme disease given the acute patient were negative  Discussed with patient that if continues to have episodic abdominal pain and diarrhea we will proceed with colonoscopy further evaluation. Of note stool studies were negative for infectious etiologies  3-Associated medical conditions include history of single kidney at birth, hypertension. .... Return in about 5 weeks (around 8/1/2022) for further management.       Clearance MD Sarika

## 2022-07-14 ENCOUNTER — APPOINTMENT (OUTPATIENT)
Dept: CT IMAGING | Age: 37
End: 2022-07-14
Payer: COMMERCIAL

## 2022-07-14 ENCOUNTER — OFFICE VISIT (OUTPATIENT)
Dept: FAMILY MEDICINE CLINIC | Age: 37
End: 2022-07-14
Payer: COMMERCIAL

## 2022-07-14 ENCOUNTER — HOSPITAL ENCOUNTER (EMERGENCY)
Age: 37
Discharge: HOME | End: 2022-07-14
Payer: COMMERCIAL

## 2022-07-14 VITALS
DIASTOLIC BLOOD PRESSURE: 80 MMHG | BODY MASS INDEX: 32.2 KG/M2 | SYSTOLIC BLOOD PRESSURE: 125 MMHG | TEMPERATURE: 98.5 F | HEIGHT: 73 IN | RESPIRATION RATE: 18 BRPM | OXYGEN SATURATION: 100 % | HEART RATE: 74 BPM | WEIGHT: 243 LBS

## 2022-07-14 VITALS
SYSTOLIC BLOOD PRESSURE: 112 MMHG | BODY MASS INDEX: 32.47 KG/M2 | WEIGHT: 245 LBS | DIASTOLIC BLOOD PRESSURE: 82 MMHG | HEART RATE: 76 BPM | OXYGEN SATURATION: 97 % | HEIGHT: 73 IN | TEMPERATURE: 97.6 F

## 2022-07-14 DIAGNOSIS — R79.89 ABNORMAL LFTS: ICD-10-CM

## 2022-07-14 DIAGNOSIS — R53.83 FATIGUE, UNSPECIFIED TYPE: Primary | ICD-10-CM

## 2022-07-14 DIAGNOSIS — R10.84 GENERALIZED ABDOMINAL PAIN: Primary | ICD-10-CM

## 2022-07-14 DIAGNOSIS — R10.84 GENERALIZED ABDOMINAL PAIN: ICD-10-CM

## 2022-07-14 DIAGNOSIS — R53.83 FATIGUE, UNSPECIFIED TYPE: ICD-10-CM

## 2022-07-14 LAB
ALBUMIN SERPL-MCNC: 5.1 G/DL (ref 3.5–4.6)
ALBUMIN SERPL-MCNC: 5.1 G/DL (ref 3.5–4.6)
ALP BLD-CCNC: 93 U/L (ref 35–104)
ALP BLD-CCNC: 98 U/L (ref 35–104)
ALT SERPL-CCNC: 30 U/L (ref 0–41)
ALT SERPL-CCNC: 33 U/L (ref 0–41)
ANION GAP SERPL CALCULATED.3IONS-SCNC: 14 MEQ/L (ref 9–15)
AST SERPL-CCNC: 21 U/L (ref 0–40)
AST SERPL-CCNC: 24 U/L (ref 0–40)
BASOPHILS ABSOLUTE: 0.1 K/UL (ref 0–0.2)
BASOPHILS RELATIVE PERCENT: 0.6 %
BILIRUB SERPL-MCNC: 0.3 MG/DL (ref 0.2–0.7)
BILIRUB SERPL-MCNC: <0.2 MG/DL (ref 0.2–0.7)
BILIRUBIN DIRECT: <0.2 MG/DL (ref 0–0.4)
BILIRUBIN, INDIRECT: ABNORMAL MG/DL (ref 0–0.6)
BUN BLDV-MCNC: 18 MG/DL (ref 6–20)
CALCIUM SERPL-MCNC: 9.5 MG/DL (ref 8.5–9.9)
CHLORIDE BLD-SCNC: 101 MEQ/L (ref 95–107)
CO2: 27 MEQ/L (ref 20–31)
CREAT SERPL-MCNC: 1.02 MG/DL (ref 0.7–1.2)
EOSINOPHILS ABSOLUTE: 0.1 K/UL (ref 0–0.7)
EOSINOPHILS RELATIVE PERCENT: 1.5 %
GFR AFRICAN AMERICAN: >60
GFR AFRICAN AMERICAN: >60
GFR NON-AFRICAN AMERICAN: >60
GFR NON-AFRICAN AMERICAN: >60
GLOBULIN: 2.4 G/DL (ref 2.3–3.5)
GLUCOSE BLD-MCNC: 79 MG/DL (ref 70–99)
HCT VFR BLD CALC: 44 % (ref 42–52)
HEMOGLOBIN: 15.2 G/DL (ref 14–18)
LACTIC ACID: 2.9 MMOL/L (ref 0.5–2.2)
LIPASE: 35 U/L (ref 12–95)
LYMPHOCYTES ABSOLUTE: 2.3 K/UL (ref 1–4.8)
LYMPHOCYTES RELATIVE PERCENT: 25.3 %
MCH RBC QN AUTO: 29.4 PG (ref 27–31.3)
MCHC RBC AUTO-ENTMCNC: 34.5 % (ref 33–37)
MCV RBC AUTO: 85.1 FL (ref 80–100)
MONOCYTES ABSOLUTE: 1 K/UL (ref 0.2–0.8)
MONOCYTES RELATIVE PERCENT: 11.1 %
NEUTROPHILS ABSOLUTE: 5.6 K/UL (ref 1.4–6.5)
NEUTROPHILS RELATIVE PERCENT: 61.5 %
PDW BLD-RTO: 13.2 % (ref 11.5–14.5)
PERFORMED ON: NORMAL
PLATELET # BLD: 244 K/UL (ref 130–400)
POC CREATININE: 1.1 MG/DL (ref 0.8–1.3)
POC SAMPLE TYPE: NORMAL
POTASSIUM SERPL-SCNC: 3.6 MEQ/L (ref 3.4–4.9)
RBC # BLD: 5.18 M/UL (ref 4.7–6.1)
SARS-COV-2, NAAT: NOT DETECTED
SODIUM BLD-SCNC: 142 MEQ/L (ref 135–144)
TOTAL PROTEIN: 7.5 G/DL (ref 6.3–8)
TOTAL PROTEIN: 7.6 G/DL (ref 6.3–8)
WBC # BLD: 9.1 K/UL (ref 4.8–10.8)

## 2022-07-14 PROCEDURE — 96372 THER/PROPH/DIAG INJ SC/IM: CPT

## 2022-07-14 PROCEDURE — 83690 ASSAY OF LIPASE: CPT

## 2022-07-14 PROCEDURE — A4216 STERILE WATER/SALINE, 10 ML: HCPCS | Performed by: PHYSICIAN ASSISTANT

## 2022-07-14 PROCEDURE — 80053 COMPREHEN METABOLIC PANEL: CPT

## 2022-07-14 PROCEDURE — 1036F TOBACCO NON-USER: CPT | Performed by: NURSE PRACTITIONER

## 2022-07-14 PROCEDURE — 2580000003 HC RX 258: Performed by: PHYSICIAN ASSISTANT

## 2022-07-14 PROCEDURE — 87635 SARS-COV-2 COVID-19 AMP PRB: CPT

## 2022-07-14 PROCEDURE — 2500000003 HC RX 250 WO HCPCS: Performed by: PHYSICIAN ASSISTANT

## 2022-07-14 PROCEDURE — 99214 OFFICE O/P EST MOD 30 MIN: CPT | Performed by: NURSE PRACTITIONER

## 2022-07-14 PROCEDURE — 96374 THER/PROPH/DIAG INJ IV PUSH: CPT

## 2022-07-14 PROCEDURE — 83605 ASSAY OF LACTIC ACID: CPT

## 2022-07-14 PROCEDURE — G8417 CALC BMI ABV UP PARAM F/U: HCPCS | Performed by: NURSE PRACTITIONER

## 2022-07-14 PROCEDURE — 6360000004 HC RX CONTRAST MEDICATION: Performed by: PHYSICIAN ASSISTANT

## 2022-07-14 PROCEDURE — 85025 COMPLETE CBC W/AUTO DIFF WBC: CPT

## 2022-07-14 PROCEDURE — 36415 COLL VENOUS BLD VENIPUNCTURE: CPT

## 2022-07-14 PROCEDURE — 74177 CT ABD & PELVIS W/CONTRAST: CPT

## 2022-07-14 PROCEDURE — G8427 DOCREV CUR MEDS BY ELIG CLIN: HCPCS | Performed by: NURSE PRACTITIONER

## 2022-07-14 PROCEDURE — 99285 EMERGENCY DEPT VISIT HI MDM: CPT

## 2022-07-14 PROCEDURE — 6360000002 HC RX W HCPCS: Performed by: PHYSICIAN ASSISTANT

## 2022-07-14 RX ORDER — DICYCLOMINE HYDROCHLORIDE 10 MG/ML
20 INJECTION INTRAMUSCULAR ONCE
Status: COMPLETED | OUTPATIENT
Start: 2022-07-14 | End: 2022-07-14

## 2022-07-14 RX ORDER — 0.9 % SODIUM CHLORIDE 0.9 %
1000 INTRAVENOUS SOLUTION INTRAVENOUS ONCE
Status: COMPLETED | OUTPATIENT
Start: 2022-07-14 | End: 2022-07-14

## 2022-07-14 RX ORDER — DICYCLOMINE HYDROCHLORIDE 10 MG/1
10 CAPSULE ORAL EVERY 6 HOURS PRN
Qty: 20 CAPSULE | Refills: 0 | Status: SHIPPED | OUTPATIENT
Start: 2022-07-14

## 2022-07-14 RX ORDER — FAMOTIDINE 20 MG/1
20 TABLET, FILM COATED ORAL 2 TIMES DAILY
Qty: 20 TABLET | Refills: 0 | Status: SHIPPED | OUTPATIENT
Start: 2022-07-14

## 2022-07-14 RX ADMIN — FAMOTIDINE 20 MG: 10 INJECTION, SOLUTION INTRAVENOUS at 15:23

## 2022-07-14 RX ADMIN — IOPAMIDOL 50 ML: 612 INJECTION, SOLUTION INTRAVENOUS at 15:41

## 2022-07-14 RX ADMIN — SODIUM CHLORIDE 1000 ML: 9 INJECTION, SOLUTION INTRAVENOUS at 15:22

## 2022-07-14 RX ADMIN — DICYCLOMINE HYDROCHLORIDE 20 MG: 20 INJECTION INTRAMUSCULAR at 15:23

## 2022-07-14 ASSESSMENT — PAIN DESCRIPTION - LOCATION: LOCATION: ABDOMEN

## 2022-07-14 ASSESSMENT — PAIN SCALES - GENERAL: PAINLEVEL_OUTOF10: 8

## 2022-07-14 ASSESSMENT — PATIENT HEALTH QUESTIONNAIRE - PHQ9
SUM OF ALL RESPONSES TO PHQ9 QUESTIONS 1 & 2: 0
SUM OF ALL RESPONSES TO PHQ QUESTIONS 1-9: 0
2. FEELING DOWN, DEPRESSED OR HOPELESS: 0
1. LITTLE INTEREST OR PLEASURE IN DOING THINGS: 0

## 2022-07-14 ASSESSMENT — ENCOUNTER SYMPTOMS
CONSTIPATION: 0
DIARRHEA: 1
ALLERGIC/IMMUNOLOGIC NEGATIVE: 1
ABDOMINAL PAIN: 1
ABDOMINAL PAIN: 1
CHEST TIGHTNESS: 0
COUGH: 0
EYE PAIN: 0
SHORTNESS OF BREATH: 0
BLOOD IN STOOL: 0
EYE PAIN: 0
SHORTNESS OF BREATH: 0
TROUBLE SWALLOWING: 0
COLOR CHANGE: 0
APNEA: 0
DIARRHEA: 0
TROUBLE SWALLOWING: 0

## 2022-07-14 ASSESSMENT — PAIN DESCRIPTION - FREQUENCY: FREQUENCY: INTERMITTENT

## 2022-07-14 ASSESSMENT — PAIN DESCRIPTION - ORIENTATION: ORIENTATION: MID;LOWER;LEFT

## 2022-07-14 ASSESSMENT — PAIN DESCRIPTION - DESCRIPTORS: DESCRIPTORS: ACHING;CRAMPING

## 2022-07-14 ASSESSMENT — PAIN - FUNCTIONAL ASSESSMENT: PAIN_FUNCTIONAL_ASSESSMENT: 0-10

## 2022-07-14 NOTE — ED TRIAGE NOTES
Pt alert and calm. Pt resp even no distress noted. Pt c/o pain 8/10 in mid upper and lower abd area. p states the he thinks he have a stomach virus. Pt us able to follow all commands.

## 2022-07-14 NOTE — ED NOTES
Pt reports he has had ongoing abd pain/cramping for about 6 weeks. Has been seeing Dr Frank Billingsley at GI center but today the pain has gotten worse. Denies fever, chills, n/v. Had diarrhea 2 days ago, none since.       Marlon Enriquez RN  07/14/22 1500

## 2022-07-14 NOTE — PROGRESS NOTES
Subjective  Chief Complaint   Patient presents with    Discuss Labs     x 4 week follow up     Fatigue     & abdominal pain        HPI    4 week f/u on fatigue and elevated liver enzymes. Still with significant fatigue and intermittent abdominal pain. He is continuing to follow with Dr. Julio Cesar Pfeiffer. Liver enzymes have improved, but his fatigue is still significant. States Dr. Julio Cesar Pfeiffer told him he would expect 6-12 months for recovery time. Has occasional days where he cannot do anything at all. Asking for FMLA for work as he does have to miss occasionally. Appetite was improving, but now is less of the last 2 weeks. Prior to this week he felt he was improving, but this week has been worse than it has in a while. 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 Past Surgical History:   Procedure Laterality Date    ANTERIOR CRUCIATE LIGAMENT REPAIR Left 2003    KNEE CARTILAGE SURGERY Left 2003    TONSILLECTOMY Bilateral 2010    WISDOM TOOTH EXTRACTION  1998    3 extracted     No family history on file.   Social History     Socioeconomic History    Marital status:      Spouse name: None    Number of children: None    Years of education: None    Highest education level: None   Occupational History    None   Tobacco Use    Smoking status: Never Smoker    Smokeless tobacco: Never Used   Substance and Sexual Activity    Alcohol use: None     Comment: rarely    Drug use: No    Sexual activity: None   Other Topics Concern    None   Social History Narrative    None     Social Determinants of Health     Financial Resource Strain: Low Risk     Difficulty of Paying Living Expenses: Not hard at all   Food Insecurity: No Food Insecurity    Worried About Running Out of Food in the Last Year: Never true  Ran Out of Food in the Last Year: Never true   Transportation Needs: Unknown    Lack of Transportation (Medical): No    Lack of Transportation (Non-Medical): Not on file   Physical Activity:     Days of Exercise per Week: Not on file    Minutes of Exercise per Session: Not on file   Stress:     Feeling of Stress : Not on file   Social Connections:     Frequency of Communication with Friends and Family: Not on file    Frequency of Social Gatherings with Friends and Family: Not on file    Attends Synagogue Services: Not on file    Active Member of [address] [place] [address] [employer] or Organizations: Not on file    Attends Club or Organization Meetings: Not on file    Marital Status: Not on file   Intimate Partner Violence:     Fear of Current or Ex-Partner: Not on file    Emotionally Abused: Not on file    Physically Abused: Not on file    Sexually Abused: Not on file   Housing Stability:     Unable to Pay for Housing in the Last Year: Not on file    Number of [place] in the Last Year: Not on file    Unstable Housing in the Last Year: Not on file     No current outpatient medications on file prior to visit. No current facility-administered medications on file prior to visit. Allergies   Allergen Reactions    Ibuprofen Other (See Comments)     Pt only has 1 kidney     Penicillins Other (See Comments)     Hereditary hx    Sulphadimidine [Sulfamethazine] Other (See Comments)     Hereditary hx, all sulpha products       Review of Systems   Constitutional: Positive for appetite change and fatigue. Negative for activity change, chills, diaphoresis and fever. HENT: Negative for ear pain, hearing loss and trouble swallowing. Eyes: Negative for pain and visual disturbance. Respiratory: Negative for cough, chest tightness and shortness of breath. Cardiovascular: Negative for chest pain, palpitations and leg swelling. Gastrointestinal: Positive for abdominal pain. Negative for constipation and diarrhea. Genitourinary: Negative for difficulty urinating. Neurological: Negative for dizziness and light-headedness. Psychiatric/Behavioral: Negative for dysphoric mood. The patient is not nervous/anxious. Objective  Vitals:    07/14/22 0848   BP: 112/82   Pulse: 76   Temp: 97.6 °F (36.4 °C)   SpO2: 97%   Weight: 245 lb (111.1 kg)   Height: 6' 1\" (1.854 m)     Physical Exam  Constitutional:       General: He is not in acute distress. Appearance: Normal appearance. He is obese. He is not ill-appearing, toxic-appearing or diaphoretic. HENT:      Head: Normocephalic and atraumatic. Right Ear: External ear normal.      Left Ear: External ear normal.      Nose: Nose normal. No congestion or rhinorrhea. Eyes:      Extraocular Movements: Extraocular movements intact. Conjunctiva/sclera: Conjunctivae normal.      Pupils: Pupils are equal, round, and reactive to light. Cardiovascular:      Rate and Rhythm: Normal rate and regular rhythm. Pulses: Normal pulses. Heart sounds: Normal heart sounds. No murmur heard. Pulmonary:      Effort: Pulmonary effort is normal. No respiratory distress. Breath sounds: Normal breath sounds. No stridor. No wheezing, rhonchi or rales. Chest:      Chest wall: No tenderness. Musculoskeletal:         General: Normal range of motion. Cervical back: Normal range of motion and neck supple. No tenderness. Right lower leg: No edema. Left lower leg: No edema. Lymphadenopathy:      Cervical: No cervical adenopathy. Skin:     General: Skin is warm. Capillary Refill: Capillary refill takes less than 2 seconds. Coloration: Skin is not jaundiced. Findings: No erythema or lesion. Neurological:      General: No focal deficit present. Mental Status: He is alert and oriented to person, place, and time. Mental status is at baseline. Cranial Nerves: No cranial nerve deficit.       Coordination: Coordination normal. Gait: Gait normal.   Psychiatric:         Mood and Affect: Mood normal.         Behavior: Behavior normal.         Thought Content: Thought content normal.         Judgment: Judgment normal.         Assessment& Plan     Diagnosis Orders   1. Fatigue, unspecified type  Testosterone, total   2. Abnormal LFTs     3. Generalized abdominal pain       Check labs today as ordered by Dr. Dana Elliott in addition to checking a testosterone level. Continue to follow with Dr. Dana Elliott, may need c-scope if symptoms are not improving. F/u in 3 months or sooner PRN. Side effects, adverse effects of the medication prescribed today, as well as treatment plan/ rationale and result expectations have been discussed with the patient who expresses understanding and desires to proceed. Close follow up to evaluate treatment results and for coordination of care. I have reviewed the patient's medical history in detail and updated the computerized patient record. As always, patient is advised that if symptoms worsen in any way they will proceed to the nearest emergency room. Orders Placed This Encounter   Procedures    Testosterone, total     Standing Status:   Future     Standing Expiration Date:   7/14/2023       No orders of the defined types were placed in this encounter. There are no discontinued medications. Return in about 3 months (around 10/14/2022) for fatigue, check up.     NUSRAT Barksdale - CNP

## 2022-07-14 NOTE — ED PROVIDER NOTES
[address] [facility]  eMERGENCYdEPARTMENT eNCOUnter      Pt Name: Maggie Goff  MRN: [id number]  [place] 1985  Date of evaluation: 7/14/2022  Provider:Cliff Sánchez PA-C    CHIEF COMPLAINT       Chief Complaint   Patient presents with    Abdominal Pain     n/v/d x1 day         HISTORY OF PRESENT ILLNESS  (Location/Symptom, Timing/Onset, Context/Setting, Quality, Duration, Modifying Factors, Severity.)   Maggie Goff is a 39 y.o. male who presents to the emergency department with complaints of diffuse abdominal cramping which acutely worsened in severity today. Patient states that he has been having ongoing abdominal pain for the past several weeks. Patient does see gastroenterology and has been diagnosed with a \"unknown virus\". Patient states that he was at his family doctor's office today and symptoms were mild but after leaving the doctor's office his symptoms acutely worsen. Patient denies any nausea or vomiting. Patient states he has had intermittent diarrhea but no diarrhea today and had a normal bowel movement today as well. Patient is not currently taking anything for the pain. HPI    Nursing Notes were reviewed and I agree. REVIEW OF SYSTEMS    (2-9 systems for level 4, 10 or more for level 5)     Review of Systems   Constitutional: Negative for diaphoresis and fever. HENT: Negative for hearing loss and trouble swallowing. Eyes: Negative for pain. Respiratory: Negative for apnea and shortness of breath. Cardiovascular: Negative for chest pain. Gastrointestinal: Positive for abdominal pain and diarrhea. Negative for blood in stool. Endocrine: Negative. Genitourinary: Negative for hematuria. Musculoskeletal: Negative for neck pain and neck stiffness. Skin: Negative for color change. Allergic/Immunologic: Negative. Neurological: Negative for dizziness and numbness. Hematological: Negative. Psychiatric/Behavioral: Negative.     All other systems reviewed and are negative. Except as noted above the remainder of the review of systems was reviewed and negative. 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 No pertinent family history. SOCIAL HISTORY       Social History     Socioeconomic History    Marital status:      Spouse name: None    Number of children: None    Years of education: None    Highest education level: None   Occupational History    None   Tobacco Use    Smoking status: Never Smoker    Smokeless tobacco: Never Used   Substance and Sexual Activity    Alcohol use: Not Currently     Comment: rarely    Drug use: No    Sexual activity: Not Currently   Other Topics Concern    None   Social History Narrative    None     Social Determinants of Health     Financial Resource Strain: Low Risk     Difficulty of Paying Living Expenses: Not hard at all   Food Insecurity: No Food Insecurity    Worried About Running Out of Food in the Last Year: Never true    Megan of Food in the Last Year: Never true   Transportation Needs: Unknown    Lack of Transportation (Medical): No    Lack of Transportation (Non-Medical):  Not on file   Physical Activity:     Days of Exercise per Week: Not on file    Minutes of Exercise per Session: Not on file   Stress:     Feeling of Stress : Not on file   Social Connections:     Frequency of Communication with Friends and Family: Not on file    Frequency of Social Gatherings with Friends and Family: Not on file    Attends Moravian Services: Not on file    Active Member of Clubs or Organizations: Not on file    Attends Club or Organization Meetings: Not on file    Marital Status: Not on file   Intimate Partner Violence:     Fear of Current or Ex-Partner: Not on file    Emotionally Abused: Not on file    Physically Abused: Not on file    Sexually Abused: Not on file   Housing Stability:     Unable to Pay for Housing in the Last Year: Not on file    Number of [place] in the Last Year: Not on file    Unstable Housing in the Last Year: Not on file       SCREENINGS    [place]raw Coma Scale  Eye Opening: Spontaneous  Best Verbal Response: Oriented  Best Motor Response: Obeys commands  Ginny Coma Scale Score: 15      PHYSICAL EXAM    (up to 7 forlevel 4, 8 or more for level 5)     ED Triage Vitals [07/14/22 1451]   BP Temp Temp src Heart Rate Resp SpO2 Height Weight   (!) 145/80 98.5 °F (36.9 °C) -- 84 17 99 % 6' 1\" (1.854 m) 243 lb (110.2 kg)       Physical Exam  Vitals and nursing note reviewed. Constitutional:       General: He is not in acute distress. Appearance: He is well-developed. He is not diaphoretic. HENT:      Head: Normocephalic and atraumatic. Mouth/Throat:      Pharynx: No oropharyngeal exudate. Eyes:      General: No scleral icterus. Conjunctiva/sclera: Conjunctivae normal.      Pupils: Pupils are equal, round, and reactive to light. Neck:      Trachea: No tracheal deviation. Cardiovascular:      Rate and Rhythm: Normal rate. Heart sounds: Normal heart sounds. Pulmonary:      Effort: Pulmonary effort is normal. No respiratory distress. Breath sounds: Normal breath sounds. Abdominal:      General: Bowel sounds are normal. There is no distension. Palpations: Abdomen is soft. Tenderness:  There is abdominal tenderness in the left upper quadrant and left lower quadrant. There is no guarding or rebound. Musculoskeletal:         General: Normal range of motion. Cervical back: Normal range of motion and neck supple. Skin:     General: Skin is warm and dry. Findings: No erythema or rash. Neurological:      Mental Status: He is alert and oriented to person, place, and time. Cranial Nerves: No cranial nerve deficit. Motor: No abnormal muscle tone. Psychiatric:         Behavior: Behavior normal.         Thought Content: Thought content normal.         Judgment: Judgment normal.           DIAGNOSTIC RESULTS     RADIOLOGY:   Non-plain film images such as CT, Ultrasound and MRI are read by the radiologist. Plain radiographic images are visualized and preliminarilyinterpreted by Sharmaine Hernandez PA-C with the below findings:        Interpretation per the Radiologist below, if available at the time of this note:    CT ABDOMEN PELVIS W IV CONTRAST Additional Contrast? None   Final Result   STABLE LIVER LESIONS. SINGLE LEFT KIDNEY. NO ACUTE CHANGE. LABS:  Labs Reviewed   COMPREHENSIVE METABOLIC PANEL - Abnormal; Notable for the following components:       Result Value    Albumin 5.1 (*)     All other components within normal limits   CBC WITH AUTO DIFFERENTIAL - Abnormal; Notable for the following components:    Monocytes Absolute 1.0 (*)     All other components within normal limits   LACTIC ACID - Abnormal; Notable for the following components:    Lactic Acid 2.9 (*)     All other components within normal limits   COVID-19, RAPID   LIPASE       All other labs were within normal range or not returnedas of this dictation.     EMERGENCYDEPARTMENT COURSE and DIFFERENTIAL DIAGNOSIS/MDM:   Vitals:    Vitals:    07/14/22 1454 07/14/22 1545 07/14/22 1600 07/14/22 1631   BP: (!) 132/90 135/83 (!) 142/84 125/80   Pulse:  85  74   Resp:  20  18   Temp:       SpO2:  97% 100% 100%   Weight:       Height:           REASSESSMENT

## 2022-07-14 NOTE — ED NOTES
Pt returned from CT. Resting in bed, no change in condition. Denies needs, awaiting results.      Dior Garcia RN  07/14/22 1094

## 2022-07-14 NOTE — ED NOTES
Pt reports pain is now at a tolerable level. PA notified.       Denia Mccauley, NARINDER  07/14/22 1874

## 2022-07-15 LAB
FERRITIN: 141 NG/ML (ref 30–400)
IRON SATURATION: 22 % (ref 20–55)
IRON: 92 UG/DL (ref 59–158)
TESTOSTERONE TOTAL: 353 NG/DL (ref 220–1000)
TOTAL IRON BINDING CAPACITY: 412 UG/DL (ref 250–450)
UNSATURATED IRON BINDING CAPACITY: 320 UG/DL (ref 112–347)

## 2022-07-16 LAB
CERULOPLASMIN: 25 MG/DL (ref 15–30)
IGA: 94 MG/DL (ref 68–408)
IGG: 756 MG/DL (ref 768–1632)
IGM: 178 MG/DL (ref 35–263)

## 2022-07-18 LAB
ANTINUCLEAR AB INTERPRETIVE COMMENT: NORMAL
ANTINUCLEAR ANTIBODY, HEP-2, IGG: NORMAL

## 2022-07-19 LAB
ALPHA-1 ANTITRYPSIN PHENOTYPE: NORMAL
ALPHA-1 ANTITRYPSIN: 141 MG/DL (ref 90–200)

## 2022-07-20 LAB — F-ACTIN AB IGA: 4.2 UNITS (ref 0–24.9)

## 2022-07-22 ENCOUNTER — TELEPHONE (OUTPATIENT)
Dept: FAMILY MEDICINE CLINIC | Age: 37
End: 2022-07-22

## 2022-07-22 NOTE — TELEPHONE ENCOUNTER
Pt calling for [place] paperwork to be done. Specialist told pt that PCP should be the one to fill out FMLA paperwork. Unknown viral infection    Employer is [employer]. Pt will be dropping of paperwork. Ernst Smallwood is out for the next few weeks.

## 2022-07-29 NOTE — TELEPHONE ENCOUNTER
Spoke to pt today and he is aware that Lamont is out until Tuesday. She did not put in her note how often she would give him off for intermittent. He stated that it is ok to wait until she comes back to send forms back.

## 2022-08-01 ENCOUNTER — OFFICE VISIT (OUTPATIENT)
Dept: GASTROENTEROLOGY | Age: 37
End: 2022-08-01
Payer: COMMERCIAL

## 2022-08-01 VITALS
SYSTOLIC BLOOD PRESSURE: 124 MMHG | DIASTOLIC BLOOD PRESSURE: 80 MMHG | HEART RATE: 90 BPM | WEIGHT: 247 LBS | BODY MASS INDEX: 32.59 KG/M2 | OXYGEN SATURATION: 98 %

## 2022-08-01 DIAGNOSIS — R79.89 ABNORMAL LFTS: Primary | ICD-10-CM

## 2022-08-01 PROCEDURE — G8417 CALC BMI ABV UP PARAM F/U: HCPCS | Performed by: INTERNAL MEDICINE

## 2022-08-01 PROCEDURE — G8427 DOCREV CUR MEDS BY ELIG CLIN: HCPCS | Performed by: INTERNAL MEDICINE

## 2022-08-01 PROCEDURE — 99213 OFFICE O/P EST LOW 20 MIN: CPT | Performed by: INTERNAL MEDICINE

## 2022-08-01 PROCEDURE — 1036F TOBACCO NON-USER: CPT | Performed by: INTERNAL MEDICINE

## 2022-08-01 ASSESSMENT — ENCOUNTER SYMPTOMS
BLOOD IN STOOL: 0
VOMITING: 0
EYE PAIN: 0
DIARRHEA: 0
ABDOMINAL DISTENTION: 0
RECTAL PAIN: 0
COLOR CHANGE: 0
CHEST TIGHTNESS: 0
SHORTNESS OF BREATH: 0
CONSTIPATION: 0
VOICE CHANGE: 0
EYE REDNESS: 0
TROUBLE SWALLOWING: 0
PHOTOPHOBIA: 0
NAUSEA: 0
WHEEZING: 0
ABDOMINAL PAIN: 0

## 2022-08-01 NOTE — PROGRESS NOTES
Subjective:      Patient ID: Maggie Goff is a 39 y.o. male who presents today for:  Chief Complaint   Patient presents with    Follow-up       HPI  Patient came in today for follow-up visit. Reported symptoms has improved though may have days when loss of energy. He did report recent ED visit for abdominal cramping that lasted a day or so and resolved subsequently. Otherwise patient reported symptoms overall improving comparing to previous. During recent hospital stay patient had a CAT scan that shows stable liver lesions with no acute changes lab work including liver function tests were within normal range. Patient came in today to discuss finding  Prior note  Since last visit, patient had initial viral studies that were negative. Reports intermittent fatigue and tiredness. Headache symptoms had resolved. Endorses watery stool every third day. Described abdominal pain intermittent in nature when it occurs either is on the left lower quadrant left upper quadrant or periumbilical.  Otherwise patient came in today for further evaluation. Since last visit, had liver function test that shows improvement transaminases. Patient came in today for the evaluation management  Background  This is a very pleasant 58-year-old who came in today for the evaluation management. Patient mentioned that he was referred to us as had abnormal imaging. Noted patient had CAT scan and MRI that shows hemangiomas. Otherwise patient mentioned that over the last 6 weeks has been feeling tired and fatigued. He  had some abdominal pain but none at this time. Reports episodes of diarrhea that seems better over the last few days and resolved. In addition to headache that seems improving. Patient works outdoors in a camp. Otherwise patient came in today for further evaluation management. Had emergency room visit that showed increased  transaminases less than 3 times normal range.   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    Hypertension     was hypertension at one time states it has been down for the past 15 years w/o medication     Past Surgical History:   Procedure Laterality Date    ANTERIOR CRUCIATE LIGAMENT REPAIR Left 2003    KNEE CARTILAGE SURGERY Left 2003    TONSILLECTOMY Bilateral 2010    WISDOM TOOTH EXTRACTION  1998    3 extracted     Social History     Socioeconomic History    Marital status:      Spouse name: Not on file    Number of children: Not on file    Years of education: Not on file    Highest education level: Not on file   Occupational History    Not on file   Tobacco Use    Smoking status: Never    Smokeless tobacco: Never   Substance and Sexual Activity    Alcohol use: Not Currently     Comment: rarely    Drug use: No    Sexual activity: Not Currently   Other Topics Concern    Not on file   Social History Narrative    Not on file     Social Determinants of Health     Financial Resource Strain: Low Risk     Difficulty of Paying Living Expenses: Not hard at all   Food Insecurity: No Food Insecurity    Worried About Running Out of Food in the Last Year: Never true    [address] in the Last Year: Never true   Transportation Needs: Unknown    Lack of Transportation (Medical): No    Lack of Transportation (Non-Medical): Not on file   Physical Activity: Not on file   Stress: Not on file   Social Connections: Not on file   Intimate Partner Violence: Not on file   Housing Stability: Not on file     No family history on file. Allergies   Allergen Reactions    Ibuprofen Other (See Comments)     Pt only has 1 kidney     Penicillins Other (See Comments)     Hereditary hx    Sulphadimidine [Sulfamethazine] Other (See Comments)     Hereditary hx, all sulpha products         Review of Systems   Constitutional:  Negative for appetite change, chills, fatigue, fever and unexpected weight change.    HENT:  Negative for nosebleeds, tinnitus, trouble swallowing and voice change. Eyes:  Negative for photophobia, pain and redness. Respiratory:  Negative for chest tightness, shortness of breath and wheezing. Cardiovascular:  Negative for chest pain, palpitations and leg swelling. Gastrointestinal:  Negative for abdominal distention, abdominal pain, blood in stool, constipation, diarrhea, nausea, rectal pain and vomiting. Endocrine: Negative for polydipsia, polyphagia and polyuria. Genitourinary:  Negative for difficulty urinating and hematuria. Skin:  Negative for color change, pallor and rash. Neurological:  Negative for dizziness, speech difficulty and headaches. Psychiatric/Behavioral:  Negative for confusion and suicidal ideas. Objective:   /80 (Site: Right Upper Arm, Position: Sitting, Cuff Size: Large Adult)   Pulse 90   Wt 247 lb (112 kg)   SpO2 98%   BMI 32.59 kg/m²     Physical Exam  Constitutional:       General: He is not in acute distress. Appearance: He is well-developed. HENT:      Head: Normocephalic and atraumatic. Eyes:      Conjunctiva/sclera: Conjunctivae normal.      Pupils: Pupils are equal, round, and reactive to light. Cardiovascular:      Rate and Rhythm: Normal rate and regular rhythm. Heart sounds: Normal heart sounds. Pulmonary:      Effort: Pulmonary effort is normal. No respiratory distress. Breath sounds: Normal breath sounds. No wheezing or rales. Abdominal:      General: Bowel sounds are normal. There is no distension. Palpations: Abdomen is soft. Abdomen is not rigid. There is no hepatomegaly, splenomegaly or mass. Tenderness: There is no abdominal tenderness. There is no guarding or rebound. Musculoskeletal:         General: No tenderness or deformity. Normal range of motion. Cervical back: Neck supple. Skin:     Coloration: Skin is not pale. Findings: No erythema or rash.    Neurological:      Mental Status: He is alert and oriented to person, place, and time. Laboratory, Pathology, Radiology reviewed in detail with relevantimportant investigations summarized below:  Lab Results   Component Value Date/Time    WBC 9.1 07/14/2022 03:15 PM    WBC 5.8 06/14/2022 09:12 AM    WBC 4.3 06/02/2022 01:28 PM    WBC 8.6 12/10/2021 08:29 AM    HGB 15.2 07/14/2022 03:15 PM    HGB 14.9 06/14/2022 09:12 AM    HGB 15.2 06/02/2022 01:28 PM    HGB 15.4 12/10/2021 08:29 AM    HCT 44.0 07/14/2022 03:15 PM    HCT 43.5 06/14/2022 09:12 AM    HCT 44.8 06/02/2022 01:28 PM    HCT 46.9 12/10/2021 08:29 AM    MCV 85.1 07/14/2022 03:15 PM    MCV 86.3 06/14/2022 09:12 AM    MCV 85.7 06/02/2022 01:28 PM    MCV 87.8 12/10/2021 08:29 AM     07/14/2022 03:15 PM     06/14/2022 09:12 AM     06/02/2022 01:28 PM     12/10/2021 08:29 AM    .  Lab Results   Component Value Date/Time    ALT 33 07/14/2022 03:15 PM    ALT 30 07/14/2022 10:05 AM    ALT 46 06/14/2022 09:12 AM    AST 21 07/14/2022 03:15 PM    AST 24 07/14/2022 10:05 AM    AST 29 06/14/2022 09:12 AM    ALKPHOS 98 07/14/2022 03:15 PM    ALKPHOS 93 07/14/2022 10:05 AM    ALKPHOS 88 06/14/2022 09:12 AM    BILITOT <0.2 07/14/2022 03:15 PM    BILITOT 0.3 07/14/2022 10:05 AM    BILITOT 0.3 06/14/2022 09:12 AM       CT ABDOMEN PELVIS W IV CONTRAST Additional Contrast? None    Result Date: 7/14/2022  EXAMINATION:  CT ABDOMEN AND PELVIS. CLINICAL HISTORY:  ACUTE LEFT-SIDED PAIN. COMPARISONS:  6/2/2022. TECHNIQUE:  Serial 5 mm scans. IV contrast. FINDINGS:  Multiple lesions in the liver are again demonstrated. These have not changed significantly since the previous exam. No definite gallbladder abnormality is demonstrated. The pancreas and spleen appear normal. The adrenals appear normal. There is absence of the right kidney. There is compensatory hypertrophy of the left kidney. The left kidney appears normal. The aorta is normal in caliber. No abdominal or pelvic mass is seen.  There is no fluid in the abdomen or pelvis. No inflammatory changes  demonstrated. No bony abnormality. STABLE LIVER LESIONS. SINGLE LEFT KIDNEY. NO ACUTE CHANGE. Lab Results   Component Value Date/Time    IRON 92 07/14/2022 10:05 AM    TIBC 412 07/14/2022 10:05 AM    FERRITIN 141 07/14/2022 10:05 AM     No results found for: INR  No components found for: ACUTEHEPATITISSCREEN  No components found for: CELIACPANEL  No components found for: STOOLCULTURE, C.DIFF, STOOLOVAPARASITE, STOOLLEUCOCYTE        Assessment:       1. Hepatic hemangiomas:  Noted recent MRI that shows multiple hepatic lesion consider hemangiomas. The large hemangioma was on the right hepatic lobe around 3.4 cm. Most recent CT scan that shows stable findings with no change in characteristics  2- Abnormal liver enzymes/intermittent abdominal pain:  Noted  normalization of transaminases at this time. Reports overall improvement of symptoms though intermittently he may have abdominal pain. had recent history of visit owing to abdominal pain and cramping that resolved subsequently after short timeframe. At that time, patient had CAT scan did not show acute findings, lab data were within normal range  Discussed with patient that if persistent or recurrent abdominal pain, will proceed with colonoscopy for comprehensive assessment. No associated change of bowel movements reported at this time. No anemia. No weight loss. No hematemesis, melena hematochezia. CT abdomen noted with no evidence of colitis and no source of the abdominal pain noted  3-Associated medical conditions include history of single kidney at birth, hypertension. .... Return in about 3 months (around 11/1/2022) for further management.       Alex Crowley MD

## 2022-10-18 ENCOUNTER — OFFICE VISIT (OUTPATIENT)
Dept: FAMILY MEDICINE CLINIC | Age: 37
End: 2022-10-18
Payer: COMMERCIAL

## 2022-10-18 VITALS
SYSTOLIC BLOOD PRESSURE: 132 MMHG | WEIGHT: 251 LBS | BODY MASS INDEX: 33.27 KG/M2 | DIASTOLIC BLOOD PRESSURE: 76 MMHG | OXYGEN SATURATION: 99 % | HEIGHT: 73 IN | HEART RATE: 72 BPM

## 2022-10-18 DIAGNOSIS — R53.83 FATIGUE, UNSPECIFIED TYPE: Primary | ICD-10-CM

## 2022-10-18 DIAGNOSIS — R10.9 ABDOMINAL CRAMPING: ICD-10-CM

## 2022-10-18 PROCEDURE — 99213 OFFICE O/P EST LOW 20 MIN: CPT | Performed by: NURSE PRACTITIONER

## 2022-10-18 PROCEDURE — G8484 FLU IMMUNIZE NO ADMIN: HCPCS | Performed by: NURSE PRACTITIONER

## 2022-10-18 PROCEDURE — 1036F TOBACCO NON-USER: CPT | Performed by: NURSE PRACTITIONER

## 2022-10-18 PROCEDURE — G8417 CALC BMI ABV UP PARAM F/U: HCPCS | Performed by: NURSE PRACTITIONER

## 2022-10-18 PROCEDURE — G8427 DOCREV CUR MEDS BY ELIG CLIN: HCPCS | Performed by: NURSE PRACTITIONER

## 2022-10-18 ASSESSMENT — ENCOUNTER SYMPTOMS
DIARRHEA: 0
SHORTNESS OF BREATH: 0
CONSTIPATION: 0
COUGH: 0
EYE PAIN: 0
COLOR CHANGE: 0
ABDOMINAL PAIN: 0
CHEST TIGHTNESS: 0
TROUBLE SWALLOWING: 0

## 2022-10-18 NOTE — PROGRESS NOTES
Subjective  Chief Complaint   Patient presents with    Fatigue     3 mo f/u    Health Maintenance     No new concerns, pt declined flu vax    GI Problem     3 mo f/u       HPI    Pt here for follow up on fatigue and abnormal lfts and abdominal cramping. Still with ongoing fatigue, feels there has been some improvement, but still getting abdominal cramping, fatigue, occasional leg cramping. Symptoms are all still there, just not to the degree that they were originally. Has some abdominal cramping every day. Pt states he was told by Dr. Lucy Mathias that these symptoms could persist for 6-12 months and would gradually improve over time. Does feel his appetite has improved and is back to normal.    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 Past Surgical History:   Procedure Laterality Date    ANTERIOR CRUCIATE LIGAMENT REPAIR Left 2003    KNEE CARTILAGE SURGERY Left 2003    TONSILLECTOMY Bilateral 2010    WISDOM TOOTH EXTRACTION  1998    3 extracted     No family history on file. Social History     Socioeconomic History    Marital status:      Spouse name: None    Number of children: None    Years of education: None    Highest education level: None   Tobacco Use    Smoking status: Never    Smokeless tobacco: Never   Substance and Sexual Activity    Alcohol use: Not Currently     Comment: rarely    Drug use: No    Sexual activity: Not Currently     Social Determinants of Health     Financial Resource Strain: Low Risk     Difficulty of Paying Living Expenses: Not hard at all   Food Insecurity: No Food Insecurity    Worried About [address] [employer] in the Last Year: Never true    Ran Out of Food in the Last Year: Never true   Transportation Needs: Unknown    Lack of Transportation (Medical):  No     Current Outpatient Medications on File Prior to Visit   Medication Sig Dispense Refill    dicyclomine (BENTYL) 10 MG capsule Take 1 capsule by mouth every 6 hours as needed (cramps) 20 capsule 0    famotidine (PEPCID) 20 MG tablet Take 1 tablet by mouth 2 times daily (Patient not taking: Reported on 10/18/2022) 20 tablet 0     No current facility-administered medications on file prior to visit. Allergies   Allergen Reactions    Ibuprofen Other (See Comments)     Pt only has 1 kidney     Penicillins Other (See Comments)     Hereditary hx    Sulphadimidine [Sulfamethazine] Other (See Comments)     Hereditary hx, all sulpha products       Review of Systems   Constitutional:  Positive for fatigue. Negative for activity change, appetite change, chills, diaphoresis and fever. HENT:  Negative for ear pain, hearing loss and trouble swallowing. Eyes:  Negative for pain and visual disturbance. Respiratory:  Negative for cough, chest tightness and shortness of breath. Cardiovascular:  Negative for chest pain, palpitations and leg swelling. Gastrointestinal:  Negative for abdominal pain, constipation and diarrhea. Abdominal cramping   Genitourinary:  Negative for difficulty urinating. Skin:  Negative for color change and rash. Neurological:  Negative for dizziness and light-headedness. Psychiatric/Behavioral:  Negative for dysphoric mood. The patient is not nervous/anxious. Objective  Vitals:    10/18/22 0802   BP: 132/76   Site: Right Upper Arm   Position: Sitting   Cuff Size: Medium Adult   Pulse: 72   SpO2: 99%   Weight: 251 lb (113.9 kg)   Height: 6' 1\" (1.854 m)     Physical Exam  Constitutional:       General: He is not in acute distress. Appearance: Normal appearance. He is obese. He is not ill-appearing, toxic-appearing or diaphoretic. HENT:      Head: Normocephalic and atraumatic.       Right Ear: External ear normal.      Left Ear: External ear normal.      Nose: Nose normal. No congestion or rhinorrhea. Eyes:      Extraocular Movements: Extraocular movements intact. Conjunctiva/sclera: Conjunctivae normal.      Pupils: Pupils are equal, round, and reactive to light. Cardiovascular:      Rate and Rhythm: Normal rate and regular rhythm. Pulses: Normal pulses. Heart sounds: Normal heart sounds. No murmur heard. Pulmonary:      Effort: Pulmonary effort is normal. No respiratory distress. Breath sounds: Normal breath sounds. No stridor. No wheezing, rhonchi or rales. Chest:      Chest wall: No tenderness. Musculoskeletal:         General: Normal range of motion. Cervical back: Normal range of motion and neck supple. No tenderness. Right lower leg: No edema. Left lower leg: No edema. Lymphadenopathy:      Cervical: No cervical adenopathy. Skin:     General: Skin is warm. Capillary Refill: Capillary refill takes less than 2 seconds. Coloration: Skin is not jaundiced. Findings: No erythema or lesion. Neurological:      General: No focal deficit present. Mental Status: He is alert and oriented to person, place, and time. Mental status is at baseline. Cranial Nerves: No cranial nerve deficit. Coordination: Coordination normal.      Gait: Gait normal.   Psychiatric:         Mood and Affect: Mood normal.         Behavior: Behavior normal.         Thought Content: Thought content normal.         Judgment: Judgment normal.       Assessment& Plan     Diagnosis Orders   1. Fatigue, unspecified type        2. Abdominal cramping          Pt will f/u with GI as scheduled on Friday. Pt symptoms improving, but slowly. If symptoms do not continue to improve, will pursue further work up with sleep study and possible referral to endo.   Side effects, adverse effects of the medication prescribed today, as well as treatment plan/ rationale and result expectations have been discussed with the patient who expresses understanding and desires to proceed. Close follow up to evaluate treatment results and for coordination of care. I have reviewed the patient's medical history in detail and updated the computerized patient record. As always, patient is advised that if symptoms worsen in any way they will proceed to the nearest emergency room. No orders of the defined types were placed in this encounter. No orders of the defined types were placed in this encounter. There are no discontinued medications. Return in about 3 months (around 1/18/2023) for fatigue, abdominal cramping.     Kalee Wing, APRN - CNP

## 2022-10-21 ENCOUNTER — OFFICE VISIT (OUTPATIENT)
Dept: GASTROENTEROLOGY | Age: 37
End: 2022-10-21
Payer: COMMERCIAL

## 2022-10-21 VITALS
OXYGEN SATURATION: 98 % | WEIGHT: 250 LBS | DIASTOLIC BLOOD PRESSURE: 76 MMHG | SYSTOLIC BLOOD PRESSURE: 132 MMHG | HEART RATE: 86 BPM | BODY MASS INDEX: 32.98 KG/M2

## 2022-10-21 DIAGNOSIS — R10.30 LOWER ABDOMINAL PAIN: Primary | ICD-10-CM

## 2022-10-21 PROCEDURE — 99214 OFFICE O/P EST MOD 30 MIN: CPT | Performed by: NURSE PRACTITIONER

## 2022-10-21 ASSESSMENT — ENCOUNTER SYMPTOMS
VOICE CHANGE: 0
CHEST TIGHTNESS: 0
RECTAL PAIN: 0
ABDOMINAL DISTENTION: 0
EYE PAIN: 0
DIARRHEA: 0
VOMITING: 0
ABDOMINAL PAIN: 1
CONSTIPATION: 0
TROUBLE SWALLOWING: 0
PHOTOPHOBIA: 0
ANAL BLEEDING: 0
COLOR CHANGE: 0
NAUSEA: 0
BLOOD IN STOOL: 0
EYE REDNESS: 0

## 2022-10-21 NOTE — PROGRESS NOTES
Subjective:      Patient ID: Bin Baker is a 44 y.o. male who presents today for:  Chief Complaint   Patient presents with    Follow-up       HPI  Patient came in as follow-up for abdominal pain and cramping, was initially seen for similar symptoms 5 months ago symptoms are associated with loss of energy severe fatigue, headaches and leg cramps, all of his symptoms have unclear etiology and patient has had extensive work-up to rule out any viral etiology. He is clinically improved from his initial presentation but does have persistent symptoms, no history of colonoscopy, no changes to bowel habits, no blood or mucus. In addition patient had incidental finding of hepatic hemangiomas with normal liver enzymes and he is asymptomatic. Otherwise no new complaints or concerns. OV 8/1/22  Patient came in today for follow-up visit. Reported symptoms has improved though may have days when loss of energy. He did report recent ED visit for abdominal cramping that lasted a day or so and resolved subsequently. Otherwise patient reported symptoms overall improving comparing to previous. During recent hospital stay patient had a CAT scan that shows stable liver lesions with no acute changes lab work including liver function tests were within normal range. Patient came in today to discuss finding  Prior note  Since last visit, patient had initial viral studies that were negative. Reports intermittent fatigue and tiredness. Headache symptoms had resolved. Endorses watery stool every third day. Described abdominal pain intermittent in nature when it occurs either is on the left lower quadrant left upper quadrant or periumbilical.  Otherwise patient came in today for further evaluation. Since last visit, had liver function test that shows improvement transaminases. Patient came in today for the evaluation management  Background  This is a very pleasant 50-year-old who came in today for the evaluation management. Patient mentioned that he was referred to us as had abnormal imaging. Noted patient had CAT scan and MRI that shows hemangiomas. Otherwise patient mentioned that over the last 6 weeks has been feeling tired and fatigued. He  had some abdominal pain but none at this time. Reports episodes of diarrhea that seems better over the last few days and resolved. In addition to headache that seems improving. Patient works outdoors in a camp. Otherwise patient came in today for further evaluation management. Had emergency room visit that showed increased  transaminases less than 3 times normal range. 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    Hypertension     was hypertension at one time states it has been down for the past 15 years w/o medication     Past Surgical History:   Procedure Laterality Date    ANTERIOR CRUCIATE LIGAMENT REPAIR Left 2003    KNEE CARTILAGE SURGERY Left 2003    TONSILLECTOMY Bilateral 2010    WISDOM TOOTH EXTRACTION  1998    3 extracted     Social History     Socioeconomic History    Marital status:      Spouse name: Not on file    Number of children: Not on file    Years of education: Not on file    Highest education level: Not on file   Occupational History    Not on file   Tobacco Use    Smoking status: Never    Smokeless tobacco: Never   Substance and Sexual Activity    Alcohol use: Not Currently     Comment: rarely    Drug use: No    Sexual activity: Not Currently   Other Topics Concern    Not on file   Social History Narrative    Not on file     Social Determinants of Health     Financial Resource Strain: Low Risk     Difficulty of Paying Living Expenses: Not hard at all   Food Insecurity: No Food Insecurity    Worried About Running Out of Food in the Last Year: Never true    [address] in the Last Year: Never true   Transportation Needs: Unknown    Lack of Transportation (Medical):  No Lack of Transportation (Non-Medical): Not on file   Physical Activity: Not on file   Stress: Not on file   Social Connections: Not on file   Intimate Partner Violence: Not on file   Housing Stability: Not on file     No family history on file. Allergies   Allergen Reactions    Ibuprofen Other (See Comments)     Pt only has 1 kidney     Penicillins Other (See Comments)     Hereditary hx    Sulphadimidine [Sulfamethazine] Other (See Comments)     Hereditary hx, all sulpha products         Review of Systems   Constitutional:  Positive for fatigue. Negative for chills and fever. HENT:  Negative for nosebleeds, tinnitus, trouble swallowing and voice change. Eyes:  Negative for photophobia, pain and redness. Respiratory:  Negative for chest tightness. Cardiovascular:  Negative for chest pain, palpitations and leg swelling. Gastrointestinal:  Positive for abdominal pain. Negative for abdominal distention, anal bleeding, blood in stool, constipation, diarrhea, nausea, rectal pain and vomiting. Endocrine: Negative for polydipsia, polyphagia and polyuria. Genitourinary:  Negative for difficulty urinating and hematuria. Musculoskeletal:  Positive for arthralgias. Skin:  Negative for color change, pallor and rash. Neurological:  Negative for dizziness, speech difficulty and headaches. Psychiatric/Behavioral:  Negative for confusion, sleep disturbance and suicidal ideas. Objective:   /76 (Site: Right Upper Arm, Position: Sitting, Cuff Size: Large Adult)   Pulse 86   Wt 250 lb (113.4 kg)   SpO2 98%   BMI 32.98 kg/m²     Physical Exam  Vitals reviewed. Constitutional:       Appearance: Normal appearance. HENT:      Head: Normocephalic and atraumatic. Nose: Nose normal.   Eyes:      Extraocular Movements: Extraocular movements intact. Conjunctiva/sclera: Conjunctivae normal.      Pupils: Pupils are equal, round, and reactive to light.    Cardiovascular:      Rate and Rhythm: Normal rate and regular rhythm. Pulses: Normal pulses. Heart sounds: Normal heart sounds. Pulmonary:      Effort: Pulmonary effort is normal.      Breath sounds: Normal breath sounds. Abdominal:      General: Abdomen is flat. Bowel sounds are normal. There is no distension. Palpations: There is no hepatomegaly, splenomegaly or mass. Tenderness: There is no abdominal tenderness. There is no guarding. Hernia: No hernia is present. Musculoskeletal:         General: No swelling or tenderness. Normal range of motion. Cervical back: Neck supple. Skin:     General: Skin is warm and dry. Coloration: Skin is not jaundiced. Findings: No erythema or rash. Neurological:      General: No focal deficit present. Mental Status: He is alert and oriented to person, place, and time.    Psychiatric:         Mood and Affect: Mood normal.         Behavior: Behavior normal.       Laboratory, Pathology, Radiology reviewed in detail with relevantimportant investigations summarized below:  Lab Results   Component Value Date/Time    WBC 9.1 07/14/2022 03:15 PM    WBC 5.8 06/14/2022 09:12 AM    WBC 4.3 06/02/2022 01:28 PM    WBC 8.6 12/10/2021 08:29 AM    HGB 15.2 07/14/2022 03:15 PM    HGB 14.9 06/14/2022 09:12 AM    HGB 15.2 06/02/2022 01:28 PM    HGB 15.4 12/10/2021 08:29 AM    HCT 44.0 07/14/2022 03:15 PM    HCT 43.5 06/14/2022 09:12 AM    HCT 44.8 06/02/2022 01:28 PM    HCT 46.9 12/10/2021 08:29 AM    MCV 85.1 07/14/2022 03:15 PM    MCV 86.3 06/14/2022 09:12 AM    MCV 85.7 06/02/2022 01:28 PM    MCV 87.8 12/10/2021 08:29 AM     07/14/2022 03:15 PM     06/14/2022 09:12 AM     06/02/2022 01:28 PM     12/10/2021 08:29 AM    .  Lab Results   Component Value Date/Time    ALT 33 07/14/2022 03:15 PM    ALT 30 07/14/2022 10:05 AM    ALT 46 06/14/2022 09:12 AM    AST 21 07/14/2022 03:15 PM    AST 24 07/14/2022 10:05 AM    AST 29 06/14/2022 09:12 AM    ALKPHOS 98 07/14/2022 03:15 PM    ALKPHOS 93 07/14/2022 10:05 AM    ALKPHOS 88 06/14/2022 09:12 AM    BILITOT <0.2 07/14/2022 03:15 PM    BILITOT 0.3 07/14/2022 10:05 AM    BILITOT 0.3 06/14/2022 09:12 AM       No results found. Lab Results   Component Value Date/Time    IRON 92 07/14/2022 10:05 AM    TIBC 412 07/14/2022 10:05 AM    FERRITIN 141 07/14/2022 10:05 AM     No results found for: INR  No components found for: ACUTEHEPATITISSCREEN  No components found for: CELIACPANEL  No components found for: STOOLCULTURE, C.DIFF, STOOLOVAPARASITE, STOOLLEUCOCYTE        Assessment:       Diagnosis Orders   1. Lower abdominal pain  Endoscopy, colon, diagnostic            Plan:      1. Hepatic hemangiomas & hx of abnormal liver enzymes:  Noted recent MRI that shows multiple hepatic lesion consider hemangiomas. The large hemangioma was on the right hepatic lobe around 3.4 cm. Noted  normalization of transaminases at this time. Most recent CT scan that shows stable findings with no change in characteristics  2- intermittent abdominal pain:  Reports overall improvement of symptoms though intermittently he may have abdominal pain. had recent history of visit owing to abdominal pain and cramping that resolved subsequently after short timeframe. At that time, patient had CAT scan did not show acute findings, lab data were within normal range  -Given the persistent recurrent abdominal pain, will proceed with colonoscopy for comprehensive assessment. Indications outcomes risks and benefits were discussed in detail, MiraLAX prep was provided and prep adherence was discussed to ensure adequate exam3-Associated medical conditions include history of single kidney at birth, hypertension    Return in about 3 months (around 1/21/2023), or if symptoms worsen or fail to improve, for colonoscopy, post procedure results.       Dwight Moreau, NUSRAT - CNP

## 2023-01-19 ENCOUNTER — OFFICE VISIT (OUTPATIENT)
Dept: FAMILY MEDICINE CLINIC | Age: 38
End: 2023-01-19
Payer: COMMERCIAL

## 2023-01-19 VITALS
BODY MASS INDEX: 33.53 KG/M2 | SYSTOLIC BLOOD PRESSURE: 132 MMHG | WEIGHT: 253 LBS | DIASTOLIC BLOOD PRESSURE: 72 MMHG | HEIGHT: 73 IN | OXYGEN SATURATION: 99 % | HEART RATE: 64 BPM

## 2023-01-19 DIAGNOSIS — R73.03 PREDIABETES: Primary | ICD-10-CM

## 2023-01-19 DIAGNOSIS — E78.1 HYPERTRIGLYCERIDEMIA: ICD-10-CM

## 2023-01-19 DIAGNOSIS — R53.83 FATIGUE, UNSPECIFIED TYPE: ICD-10-CM

## 2023-01-19 PROCEDURE — G8484 FLU IMMUNIZE NO ADMIN: HCPCS | Performed by: NURSE PRACTITIONER

## 2023-01-19 PROCEDURE — G8417 CALC BMI ABV UP PARAM F/U: HCPCS | Performed by: NURSE PRACTITIONER

## 2023-01-19 PROCEDURE — 99213 OFFICE O/P EST LOW 20 MIN: CPT | Performed by: NURSE PRACTITIONER

## 2023-01-19 PROCEDURE — G8427 DOCREV CUR MEDS BY ELIG CLIN: HCPCS | Performed by: NURSE PRACTITIONER

## 2023-01-19 PROCEDURE — 1036F TOBACCO NON-USER: CPT | Performed by: NURSE PRACTITIONER

## 2023-01-19 SDOH — ECONOMIC STABILITY: FOOD INSECURITY: WITHIN THE PAST 12 MONTHS, THE FOOD YOU BOUGHT JUST DIDN'T LAST AND YOU DIDN'T HAVE MONEY TO GET MORE.: NEVER TRUE

## 2023-01-19 SDOH — ECONOMIC STABILITY: FOOD INSECURITY: WITHIN THE PAST 12 MONTHS, YOU WORRIED THAT YOUR FOOD WOULD RUN OUT BEFORE YOU GOT MONEY TO BUY MORE.: NEVER TRUE

## 2023-01-19 ASSESSMENT — PATIENT HEALTH QUESTIONNAIRE - PHQ9
SUM OF ALL RESPONSES TO PHQ QUESTIONS 1-9: 0
2. FEELING DOWN, DEPRESSED OR HOPELESS: 0
SUM OF ALL RESPONSES TO PHQ9 QUESTIONS 1 & 2: 0
1. LITTLE INTEREST OR PLEASURE IN DOING THINGS: 0
SUM OF ALL RESPONSES TO PHQ QUESTIONS 1-9: 0

## 2023-01-19 ASSESSMENT — ENCOUNTER SYMPTOMS
SHORTNESS OF BREATH: 0
ABDOMINAL PAIN: 0
COUGH: 0
EYE PAIN: 0
CONSTIPATION: 0
CHEST TIGHTNESS: 0
DIARRHEA: 0
TROUBLE SWALLOWING: 0

## 2023-01-19 ASSESSMENT — SOCIAL DETERMINANTS OF HEALTH (SDOH): HOW HARD IS IT FOR YOU TO PAY FOR THE VERY BASICS LIKE FOOD, HOUSING, MEDICAL CARE, AND HEATING?: NOT HARD AT ALL

## 2023-01-19 NOTE — PROGRESS NOTES
Subjective  Chief Complaint   Patient presents with    Fatigue     3 mo f/u    Abdominal Cramping     3 mo f/u    Health Maintenance     Pt declined flu vax       HPI    Pt here for follow up on abdominal pain and fatigue. Feels he is definitely improving. Went from having episodes every 2 weeks to now every 4 weeks. Still not feeling completely back to normal, but definitely better than he was. Was scheduled to have c-scope, but was told that if symptoms improved he could cancel so he did not get it done. Has had no abdominal pain in 6-7 weeks. 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 Social History     Socioeconomic History    Marital status:      Spouse name: None    Number of children: None    Years of education: None    Highest education level: None   Tobacco Use    Smoking status: Never    Smokeless tobacco: Never   Substance and Sexual Activity    Alcohol use: Not Currently     Comment: rarely    Drug use: No    Sexual activity: Not Currently     Social Determinants of Health     Financial Resource Strain: Low Risk     Difficulty of Paying Living Expenses: Not hard at all   Food Insecurity: No Food Insecurity    Worried About Running Out of Food in the Last Year: Never true    Ran Out of Food in the Last Year: Never true     No current outpatient medications on file prior to visit. No current facility-administered medications on file prior to visit.      Allergies   Allergen Reactions    Ibuprofen Other (See Comments)     Pt only has 1 kidney     Penicillins Other (See Comments)     Hereditary hx    Sulphadimidine [Sulfamethazine] Other (See Comments)     Hereditary hx, all sulpha products       Review of Systems   Constitutional:  Positive for fatigue. Negative for activity change, appetite change, chills, diaphoresis and fever. HENT:  Negative for ear pain, hearing loss and trouble swallowing. Eyes:  Negative for pain and visual disturbance. Respiratory:  Negative for cough, chest tightness and shortness of breath. Cardiovascular:  Negative for chest pain, palpitations and leg swelling. Gastrointestinal:  Negative for abdominal pain, constipation and diarrhea. Genitourinary:  Negative for difficulty urinating. Neurological:  Negative for dizziness and light-headedness. Objective  Vitals:    01/19/23 0745   BP: 132/72   Site: Left Upper Arm   Position: Sitting   Cuff Size: Large Adult   Pulse: 64   SpO2: 99%   Weight: 253 lb (114.8 kg)   Height: 6' 1\" (1.854 m)     Physical Exam  Constitutional:       General: He is not in acute distress. Appearance: Normal appearance. He is obese. He is not ill-appearing, toxic-appearing or diaphoretic. HENT:      Head: Normocephalic and atraumatic. Right Ear: External ear normal.      Left Ear: External ear normal.      Nose: Nose normal. No congestion or rhinorrhea. Eyes:      Extraocular Movements: Extraocular movements intact. Conjunctiva/sclera: Conjunctivae normal.      Pupils: Pupils are equal, round, and reactive to light. Cardiovascular:      Rate and Rhythm: Normal rate and regular rhythm. Pulses: Normal pulses. Heart sounds: Normal heart sounds. No murmur heard. Pulmonary:      Effort: Pulmonary effort is normal. No respiratory distress. Breath sounds: Normal breath sounds. No stridor. No wheezing, rhonchi or rales. Chest:      Chest wall: No tenderness.    Musculoskeletal:         General: Normal range of motion. Cervical back: Normal range of motion and neck supple. No tenderness. Right lower leg: No edema. Left lower leg: No edema. Lymphadenopathy:      Cervical: No cervical adenopathy. Skin:     General: Skin is warm. Capillary Refill: Capillary refill takes less than 2 seconds. Coloration: Skin is not jaundiced. Findings: No erythema or lesion. Neurological:      General: No focal deficit present. Mental Status: He is alert and oriented to person, place, and time. Mental status is at baseline. Cranial Nerves: No cranial nerve deficit. Coordination: Coordination normal.      Gait: Gait normal.   Psychiatric:         Mood and Affect: Mood normal.         Behavior: Behavior normal.         Thought Content: Thought content normal.         Judgment: Judgment normal.       Assessment& Plan     Diagnosis Orders   1. Prediabetes  Hemoglobin A1C      2. Fatigue, unspecified type  CBC    Comprehensive Metabolic Panel      3. Hypertriglyceridemia  Lipid Panel        Healthy diet, exercise, rest (at least 7 hours of sleep each night). F/u in June with labs prior or sooner PRN. Side effects, adverse effects of the medication prescribed today, as well as treatment plan/ rationale and result expectations have been discussed with the patient who expresses understanding and desires to proceed. Close follow up to evaluate treatment results and for coordination of care. I have reviewed the patient's medical history in detail and updated the computerized patient record. As always, patient is advised that if symptoms worsen in any way they will proceed to the nearest emergency room.        Orders Placed This Encounter   Procedures    CBC     Standing Status:   Future     Standing Expiration Date:   1/19/2024    Comprehensive Metabolic Panel     Standing Status:   Future     Standing Expiration Date:   1/19/2024    Hemoglobin A1C     Standing Status:   Future     Standing Expiration Date:   1/19/2024    Lipid Panel     Standing Status:   Future     Standing Expiration Date:   1/19/2024     Order Specific Question:   Is Patient Fasting?/# of Hours     Answer:   10       No orders of the defined types were placed in this encounter. Medications Discontinued During This Encounter   Medication Reason    dicyclomine (BENTYL) 10 MG capsule Therapy completed    famotidine (PEPCID) 20 MG tablet Therapy completed       Return in about 5 months (around 6/19/2023) for fatigue, prediabetes.     NUSRAT Lockhart - CNP

## 2023-06-15 ENCOUNTER — APPOINTMENT (OUTPATIENT)
Dept: CT IMAGING | Age: 38
End: 2023-06-15
Payer: COMMERCIAL

## 2023-06-15 ENCOUNTER — HOSPITAL ENCOUNTER (EMERGENCY)
Age: 38
Discharge: HOME | End: 2023-06-15
Attending: EMERGENCY MEDICINE
Payer: COMMERCIAL

## 2023-06-15 VITALS
DIASTOLIC BLOOD PRESSURE: 79 MMHG | HEIGHT: 73 IN | OXYGEN SATURATION: 98 % | WEIGHT: 250 LBS | BODY MASS INDEX: 33.13 KG/M2 | HEART RATE: 71 BPM | SYSTOLIC BLOOD PRESSURE: 131 MMHG | RESPIRATION RATE: 17 BRPM | TEMPERATURE: 98.3 F

## 2023-06-15 DIAGNOSIS — R10.31 RIGHT LOWER QUADRANT ABDOMINAL PAIN: Primary | ICD-10-CM

## 2023-06-15 LAB
ALBUMIN SERPL-MCNC: 4.7 G/DL (ref 3.5–4.6)
ALP SERPL-CCNC: 114 U/L (ref 35–104)
ALT SERPL-CCNC: 24 U/L (ref 0–41)
ANION GAP SERPL CALCULATED.3IONS-SCNC: 14 MEQ/L (ref 9–15)
AST SERPL-CCNC: 13 U/L (ref 0–40)
BASOPHILS # BLD: 0 K/UL (ref 0–0.2)
BASOPHILS NFR BLD: 0.3 %
BILIRUB SERPL-MCNC: 0.5 MG/DL (ref 0.2–0.7)
BILIRUB UR QL STRIP: NEGATIVE
BUN SERPL-MCNC: 15 MG/DL (ref 6–20)
CALCIUM SERPL-MCNC: 9.7 MG/DL (ref 8.5–9.9)
CHLORIDE SERPL-SCNC: 100 MEQ/L (ref 95–107)
CHOLEST SERPL-MCNC: 257 MG/DL (ref 0–199)
CLARITY UR: CLEAR
CO2 SERPL-SCNC: 25 MEQ/L (ref 20–31)
COLOR UR: YELLOW
CREAT SERPL-MCNC: 0.93 MG/DL (ref 0.7–1.2)
EOSINOPHIL # BLD: 0.1 K/UL (ref 0–0.7)
EOSINOPHIL NFR BLD: 0.7 %
ERYTHROCYTE [DISTWIDTH] IN BLOOD BY AUTOMATED COUNT: 12.8 % (ref 11.5–14.5)
GLOBULIN SER CALC-MCNC: 3 G/DL (ref 2.3–3.5)
GLUCOSE SERPL-MCNC: 100 MG/DL (ref 70–99)
GLUCOSE UR STRIP-MCNC: NEGATIVE MG/DL
HBA1C MFR BLD: 5.8 % (ref 4.8–5.9)
HCT VFR BLD AUTO: 48.4 % (ref 42–52)
HDLC SERPL-MCNC: 55 MG/DL (ref 40–59)
HGB BLD-MCNC: 16.4 G/DL (ref 14–18)
HGB UR QL STRIP: NEGATIVE
KETONES UR STRIP-MCNC: ABNORMAL MG/DL
LACTATE BLDV-SCNC: 1.9 MMOL/L (ref 0.5–2.2)
LDLC SERPL CALC-MCNC: 166 MG/DL (ref 0–129)
LEUKOCYTE ESTERASE UR QL STRIP: NEGATIVE
LIPASE SERPL-CCNC: 26 U/L (ref 12–95)
LYMPHOCYTES # BLD: 1.9 K/UL (ref 1–4.8)
LYMPHOCYTES NFR BLD: 17.5 %
MCH RBC QN AUTO: 28.9 PG (ref 27–31.3)
MCHC RBC AUTO-ENTMCNC: 33.9 % (ref 33–37)
MCV RBC AUTO: 85.4 FL (ref 79–92.2)
MONOCYTES # BLD: 1 K/UL (ref 0.2–0.8)
MONOCYTES NFR BLD: 9.4 %
NEUTROPHILS # BLD: 7.9 K/UL (ref 1.4–6.5)
NEUTS SEG NFR BLD: 72.1 %
NITRITE UR QL STRIP: NEGATIVE
PH UR STRIP: 5.5 [PH] (ref 5–9)
PLATELET # BLD AUTO: 252 K/UL (ref 130–400)
POTASSIUM SERPL-SCNC: 3.9 MEQ/L (ref 3.4–4.9)
PROT SERPL-MCNC: 7.7 G/DL (ref 6.3–8)
PROT UR STRIP-MCNC: NEGATIVE MG/DL
RBC # BLD AUTO: 5.67 M/UL (ref 4.7–6.1)
SODIUM SERPL-SCNC: 139 MEQ/L (ref 135–144)
SP GR UR STRIP: 1.07 (ref 1–1.03)
TRIGL SERPL-MCNC: 179 MG/DL (ref 0–150)
URINE REFLEX TO CULTURE: ABNORMAL
UROBILINOGEN UR STRIP-ACNC: 0.2 E.U./DL
WBC # BLD AUTO: 11 K/UL (ref 4.8–10.8)

## 2023-06-15 PROCEDURE — 36415 COLL VENOUS BLD VENIPUNCTURE: CPT

## 2023-06-15 PROCEDURE — 96375 TX/PRO/DX INJ NEW DRUG ADDON: CPT

## 2023-06-15 PROCEDURE — 80061 LIPID PANEL: CPT

## 2023-06-15 PROCEDURE — 83036 HEMOGLOBIN GLYCOSYLATED A1C: CPT

## 2023-06-15 PROCEDURE — 96374 THER/PROPH/DIAG INJ IV PUSH: CPT

## 2023-06-15 PROCEDURE — 81003 URINALYSIS AUTO W/O SCOPE: CPT

## 2023-06-15 PROCEDURE — 96361 HYDRATE IV INFUSION ADD-ON: CPT

## 2023-06-15 PROCEDURE — 80053 COMPREHEN METABOLIC PANEL: CPT

## 2023-06-15 PROCEDURE — 6360000002 HC RX W HCPCS: Performed by: PHYSICIAN ASSISTANT

## 2023-06-15 PROCEDURE — 99285 EMERGENCY DEPT VISIT HI MDM: CPT

## 2023-06-15 PROCEDURE — 2580000003 HC RX 258: Performed by: PHYSICIAN ASSISTANT

## 2023-06-15 PROCEDURE — 96376 TX/PRO/DX INJ SAME DRUG ADON: CPT

## 2023-06-15 PROCEDURE — 83690 ASSAY OF LIPASE: CPT

## 2023-06-15 PROCEDURE — 85025 COMPLETE CBC W/AUTO DIFF WBC: CPT

## 2023-06-15 PROCEDURE — 83605 ASSAY OF LACTIC ACID: CPT

## 2023-06-15 PROCEDURE — 6360000004 HC RX CONTRAST MEDICATION: Performed by: PHYSICIAN ASSISTANT

## 2023-06-15 PROCEDURE — 74177 CT ABD & PELVIS W/CONTRAST: CPT

## 2023-06-15 RX ORDER — TRAMADOL HYDROCHLORIDE 50 MG/1
50 TABLET ORAL EVERY 6 HOURS PRN
Qty: 12 TABLET | Refills: 0 | Status: SHIPPED | OUTPATIENT
Start: 2023-06-15 | End: 2023-06-18

## 2023-06-15 RX ORDER — MORPHINE SULFATE 2 MG/ML
2 INJECTION, SOLUTION INTRAMUSCULAR; INTRAVENOUS ONCE
Status: COMPLETED | OUTPATIENT
Start: 2023-06-15 | End: 2023-06-15

## 2023-06-15 RX ORDER — ONDANSETRON 4 MG/1
4 TABLET, ORALLY DISINTEGRATING ORAL 3 TIMES DAILY PRN
Qty: 12 TABLET | Refills: 0 | Status: SHIPPED | OUTPATIENT
Start: 2023-06-15 | End: 2023-06-23 | Stop reason: ALTCHOICE

## 2023-06-15 RX ORDER — 0.9 % SODIUM CHLORIDE 0.9 %
1000 INTRAVENOUS SOLUTION INTRAVENOUS ONCE
Status: COMPLETED | OUTPATIENT
Start: 2023-06-15 | End: 2023-06-15

## 2023-06-15 RX ORDER — ONDANSETRON 2 MG/ML
4 INJECTION INTRAMUSCULAR; INTRAVENOUS ONCE
Status: COMPLETED | OUTPATIENT
Start: 2023-06-15 | End: 2023-06-15

## 2023-06-15 RX ORDER — MORPHINE SULFATE 4 MG/ML
4 INJECTION, SOLUTION INTRAMUSCULAR; INTRAVENOUS ONCE
Status: COMPLETED | OUTPATIENT
Start: 2023-06-15 | End: 2023-06-15

## 2023-06-15 RX ADMIN — ONDANSETRON 4 MG: 2 INJECTION INTRAMUSCULAR; INTRAVENOUS at 14:44

## 2023-06-15 RX ADMIN — SODIUM CHLORIDE 1000 ML: 9 INJECTION, SOLUTION INTRAVENOUS at 14:45

## 2023-06-15 RX ADMIN — MORPHINE SULFATE 4 MG: 4 INJECTION, SOLUTION INTRAMUSCULAR; INTRAVENOUS at 16:24

## 2023-06-15 RX ADMIN — MORPHINE SULFATE 2 MG: 2 INJECTION, SOLUTION INTRAMUSCULAR; INTRAVENOUS at 14:44

## 2023-06-15 RX ADMIN — IOPAMIDOL 50 ML: 612 INJECTION, SOLUTION INTRAVENOUS at 15:06

## 2023-06-15 ASSESSMENT — PAIN DESCRIPTION - FREQUENCY: FREQUENCY: CONTINUOUS

## 2023-06-15 ASSESSMENT — PAIN DESCRIPTION - PAIN TYPE: TYPE: ACUTE PAIN

## 2023-06-15 ASSESSMENT — PAIN DESCRIPTION - LOCATION: LOCATION: ABDOMEN

## 2023-06-15 ASSESSMENT — ENCOUNTER SYMPTOMS
TROUBLE SWALLOWING: 0
SHORTNESS OF BREATH: 0
ABDOMINAL PAIN: 1
APNEA: 0
ALLERGIC/IMMUNOLOGIC NEGATIVE: 1
NAUSEA: 1
EYE PAIN: 0
COLOR CHANGE: 0

## 2023-06-15 ASSESSMENT — PAIN SCALES - GENERAL
PAINLEVEL_OUTOF10: 9
PAINLEVEL_OUTOF10: 4

## 2023-06-15 ASSESSMENT — PAIN - FUNCTIONAL ASSESSMENT: PAIN_FUNCTIONAL_ASSESSMENT: 0-10

## 2023-06-15 ASSESSMENT — PAIN DESCRIPTION - DESCRIPTORS: DESCRIPTORS: ACHING

## 2023-06-15 NOTE — ED PROVIDER NOTES
[address] [facility] ED  eMERGENCYdEPARTMENT eNCOUnter        Pt Name: Juan Francisco Diggs  MRN: [id number]  Armserlindagfjade [birth year]HA evaluation: 6/15/2023  Provider:Cliff Sarah PA-C    CHIEF COMPLAINT       Chief Complaint   Patient presents with    Abdominal Pain         HISTORY OF PRESENT ILLNESS  (Location/Symptom, Timing/Onset, Context/Setting, Quality, Duration, Modifying Factors, Severity.)   Juan Francisco Diggs is a 44 y.o. male who presents to the emergency department with complaints of right lower quadrant abdominal pain that began in the periumbilical region of the abdomen yesterday and localized to the right lower quadrant today. Patient states nausea and dry heaves but denies any vomiting. Patient states poor appetite. Denies any blood in the stool. Patient denies any hematuria or dysuria. Patient last took Tylenol for symptoms this morning    HPI    Nursing Notes were reviewed and I agree. REVIEW OF SYSTEMS    (2-9 systems for level 4, 10 or more for level 5)     Review of Systems   Constitutional:  Positive for appetite change. Negative for diaphoresis and fever. HENT:  Negative for hearing loss and trouble swallowing. Eyes:  Negative for pain. Respiratory:  Negative for apnea and shortness of breath. Cardiovascular:  Negative for chest pain. Gastrointestinal:  Positive for abdominal pain and nausea. Endocrine: Negative. Genitourinary:  Negative for dysuria, flank pain and hematuria. Musculoskeletal:  Negative for neck pain and neck stiffness. Skin:  Negative for color change. Allergic/Immunologic: Negative. Neurological:  Negative for dizziness and numbness. Hematological: Negative. Psychiatric/Behavioral: Negative. All other systems reviewed and are negative. as noted above the remainder of the review of systems was reviewed and negative.        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

## 2023-06-16 LAB
PERFORMED ON: NORMAL
POC CREATININE: 1.2 MG/DL (ref 0.8–1.3)
POC SAMPLE TYPE: NORMAL

## 2023-06-23 ENCOUNTER — OFFICE VISIT (OUTPATIENT)
Dept: FAMILY MEDICINE CLINIC | Age: 38
End: 2023-06-23
Payer: COMMERCIAL

## 2023-06-23 VITALS
BODY MASS INDEX: 33.66 KG/M2 | HEART RATE: 78 BPM | WEIGHT: 254 LBS | HEIGHT: 73 IN | SYSTOLIC BLOOD PRESSURE: 118 MMHG | OXYGEN SATURATION: 99 % | DIASTOLIC BLOOD PRESSURE: 80 MMHG

## 2023-06-23 DIAGNOSIS — R10.9 ABDOMINAL CRAMPING: ICD-10-CM

## 2023-06-23 DIAGNOSIS — R53.83 FATIGUE, UNSPECIFIED TYPE: ICD-10-CM

## 2023-06-23 DIAGNOSIS — R73.03 PREDIABETES: Primary | ICD-10-CM

## 2023-06-23 DIAGNOSIS — R10.84 GENERALIZED ABDOMINAL PAIN: ICD-10-CM

## 2023-06-23 PROCEDURE — 1036F TOBACCO NON-USER: CPT | Performed by: NURSE PRACTITIONER

## 2023-06-23 PROCEDURE — G8427 DOCREV CUR MEDS BY ELIG CLIN: HCPCS | Performed by: NURSE PRACTITIONER

## 2023-06-23 PROCEDURE — G8417 CALC BMI ABV UP PARAM F/U: HCPCS | Performed by: NURSE PRACTITIONER

## 2023-06-23 PROCEDURE — 99214 OFFICE O/P EST MOD 30 MIN: CPT | Performed by: NURSE PRACTITIONER

## 2023-06-23 SDOH — ECONOMIC STABILITY: HOUSING INSECURITY
IN THE LAST 12 MONTHS, WAS THERE A TIME WHEN YOU DID NOT HAVE A STEADY PLACE TO SLEEP OR SLEPT IN A SHELTER (INCLUDING NOW)?: NO

## 2023-06-23 SDOH — ECONOMIC STABILITY: INCOME INSECURITY: HOW HARD IS IT FOR YOU TO PAY FOR THE VERY BASICS LIKE FOOD, HOUSING, MEDICAL CARE, AND HEATING?: NOT HARD AT ALL

## 2023-06-23 SDOH — ECONOMIC STABILITY: FOOD INSECURITY: WITHIN THE PAST 12 MONTHS, THE FOOD YOU BOUGHT JUST DIDN'T LAST AND YOU DIDN'T HAVE MONEY TO GET MORE.: NEVER TRUE

## 2023-06-23 SDOH — ECONOMIC STABILITY: FOOD INSECURITY: WITHIN THE PAST 12 MONTHS, YOU WORRIED THAT YOUR FOOD WOULD RUN OUT BEFORE YOU GOT MONEY TO BUY MORE.: NEVER TRUE

## 2023-06-23 ASSESSMENT — ENCOUNTER SYMPTOMS
COLOR CHANGE: 0
EYE PAIN: 0
SHORTNESS OF BREATH: 0
ABDOMINAL PAIN: 1
CONSTIPATION: 0
CHEST TIGHTNESS: 0
COUGH: 0
TROUBLE SWALLOWING: 0
DIARRHEA: 0

## 2023-06-23 NOTE — PROGRESS NOTES
No Food Insecurity    Worried About Running Out of Food in the Last Year: Never true    Ran Out of Food in the Last Year: Never true   Transportation Needs: Unknown    Lack of Transportation (Non-Medical): No   Housing Stability: Unknown    Unstable Housing in the Last Year: No     No current outpatient medications on file prior to visit. No current facility-administered medications on file prior to visit. Allergies   Allergen Reactions    Ibuprofen Other (See Comments)     Pt only has 1 kidney     Penicillins Other (See Comments)     Hereditary hx    Sulphadimidine [Sulfamethazine] Other (See Comments)     Hereditary hx, all sulpha products       Review of Systems   Constitutional:  Positive for fatigue. Negative for activity change, appetite change, chills, diaphoresis and fever. HENT:  Negative for ear pain, hearing loss and trouble swallowing. Eyes:  Negative for pain and visual disturbance. Respiratory:  Negative for cough, chest tightness and shortness of breath. Cardiovascular:  Negative for chest pain, palpitations and leg swelling. Gastrointestinal:  Positive for abdominal pain. Negative for constipation and diarrhea. Genitourinary:  Negative for difficulty urinating. Skin:  Negative for color change and rash. Neurological:  Negative for dizziness and light-headedness. Psychiatric/Behavioral:  Negative for dysphoric mood. The patient is not nervous/anxious. Objective  Vitals:    06/23/23 0734   BP: 118/80   Site: Right Upper Arm   Position: Sitting   Cuff Size: Large Adult   Pulse: 78   SpO2: 99%   Weight: 254 lb (115.2 kg)   Height: 6' 1\" (1.854 m)     Physical Exam  Constitutional:       General: He is not in acute distress. Appearance: Normal appearance. He is obese. He is not ill-appearing, toxic-appearing or diaphoretic. HENT:      Head: Normocephalic and atraumatic.       Right Ear: External ear normal.      Left Ear: External ear normal.      Nose: Nose normal.

## 2023-07-17 ENCOUNTER — OFFICE VISIT (OUTPATIENT)
Dept: GASTROENTEROLOGY | Age: 38
End: 2023-07-17
Payer: COMMERCIAL

## 2023-07-17 ENCOUNTER — PREP FOR PROCEDURE (OUTPATIENT)
Dept: GASTROENTEROLOGY | Age: 38
End: 2023-07-17

## 2023-07-17 VITALS
BODY MASS INDEX: 33.78 KG/M2 | WEIGHT: 256 LBS | OXYGEN SATURATION: 98 % | HEART RATE: 88 BPM | DIASTOLIC BLOOD PRESSURE: 70 MMHG | SYSTOLIC BLOOD PRESSURE: 120 MMHG

## 2023-07-17 DIAGNOSIS — R10.9 ABDOMINAL PAIN, UNSPECIFIED ABDOMINAL LOCATION: Primary | ICD-10-CM

## 2023-07-17 PROCEDURE — 1036F TOBACCO NON-USER: CPT | Performed by: INTERNAL MEDICINE

## 2023-07-17 PROCEDURE — 99214 OFFICE O/P EST MOD 30 MIN: CPT | Performed by: INTERNAL MEDICINE

## 2023-07-17 PROCEDURE — G8417 CALC BMI ABV UP PARAM F/U: HCPCS | Performed by: INTERNAL MEDICINE

## 2023-07-17 PROCEDURE — G8427 DOCREV CUR MEDS BY ELIG CLIN: HCPCS | Performed by: INTERNAL MEDICINE

## 2023-07-17 ASSESSMENT — ENCOUNTER SYMPTOMS
CONSTIPATION: 0
COLOR CHANGE: 0
VOMITING: 0
RECTAL PAIN: 0
VOICE CHANGE: 0
SHORTNESS OF BREATH: 0
PHOTOPHOBIA: 0
CHEST TIGHTNESS: 0
EYE REDNESS: 0
WHEEZING: 0
ABDOMINAL DISTENTION: 0
TROUBLE SWALLOWING: 0
ABDOMINAL PAIN: 0
NAUSEA: 0
EYE PAIN: 0
DIARRHEA: 0
BLOOD IN STOOL: 0

## 2023-07-17 NOTE — PROGRESS NOTES
accordingly. Patient is currently asymptomatic   Indications outcomes risks and benefits were discussed in detail, MiraLAX prep was provided and prep adherence was discussed to ensure adequate exam  3-Associated medical conditions include history of single kidney at birth, hypertension      Return in about 3 months (around 10/17/2023) for further management, Post procedure results discussion.       Tor Sullivan MD

## 2023-07-25 ENCOUNTER — OFFICE VISIT (OUTPATIENT)
Dept: FAMILY MEDICINE CLINIC | Age: 38
End: 2023-07-25
Payer: COMMERCIAL

## 2023-07-25 VITALS
SYSTOLIC BLOOD PRESSURE: 134 MMHG | DIASTOLIC BLOOD PRESSURE: 82 MMHG | HEIGHT: 73 IN | HEART RATE: 75 BPM | WEIGHT: 252.4 LBS | OXYGEN SATURATION: 98 % | BODY MASS INDEX: 33.45 KG/M2

## 2023-07-25 DIAGNOSIS — R73.03 PREDIABETES: Primary | ICD-10-CM

## 2023-07-25 DIAGNOSIS — E66.9 CLASS 1 OBESITY WITH SERIOUS COMORBIDITY AND BODY MASS INDEX (BMI) OF 33.0 TO 33.9 IN ADULT, UNSPECIFIED OBESITY TYPE: ICD-10-CM

## 2023-07-25 PROCEDURE — G8427 DOCREV CUR MEDS BY ELIG CLIN: HCPCS | Performed by: NURSE PRACTITIONER

## 2023-07-25 PROCEDURE — G8417 CALC BMI ABV UP PARAM F/U: HCPCS | Performed by: NURSE PRACTITIONER

## 2023-07-25 PROCEDURE — 99214 OFFICE O/P EST MOD 30 MIN: CPT | Performed by: NURSE PRACTITIONER

## 2023-07-25 PROCEDURE — 1036F TOBACCO NON-USER: CPT | Performed by: NURSE PRACTITIONER

## 2023-07-25 RX ORDER — PHENTERMINE HYDROCHLORIDE 37.5 MG/1
37.5 TABLET ORAL
Qty: 30 TABLET | Refills: 0 | Status: SHIPPED | OUTPATIENT
Start: 2023-07-25 | End: 2023-08-24

## 2023-07-25 ASSESSMENT — ENCOUNTER SYMPTOMS
CHEST TIGHTNESS: 0
COLOR CHANGE: 0
TROUBLE SWALLOWING: 0
COUGH: 0
ABDOMINAL PAIN: 0
CONSTIPATION: 0
SHORTNESS OF BREATH: 0
DIARRHEA: 0
EYE PAIN: 0

## 2023-07-25 NOTE — PROGRESS NOTES
in the Last Year: Never true    Ran Out of Food in the Last Year: Never true   Transportation Needs: Unknown    Lack of Transportation (Non-Medical): No   Housing Stability: Unknown    Unstable Housing in the Last Year: No     No current outpatient medications on file prior to visit. No current facility-administered medications on file prior to visit. Allergies   Allergen Reactions    Ibuprofen Other (See Comments)     Pt only has 1 kidney     Penicillins Other (See Comments)     Hereditary hx    Sulphadimidine [Sulfamethazine] Other (See Comments)     Hereditary hx, all sulpha products       Review of Systems   Constitutional:  Negative for activity change, appetite change, chills, diaphoresis, fatigue and fever. HENT:  Negative for ear pain, hearing loss and trouble swallowing. Eyes:  Negative for pain and visual disturbance. Respiratory:  Negative for cough, chest tightness and shortness of breath. Cardiovascular:  Negative for chest pain, palpitations and leg swelling. Gastrointestinal:  Negative for abdominal pain, constipation and diarrhea. Genitourinary:  Negative for difficulty urinating. Skin:  Negative for color change and rash. Neurological:  Negative for dizziness and light-headedness. Objective  Vitals:    07/25/23 0810   BP: 134/82   Site: Left Upper Arm   Position: Sitting   Cuff Size: Large Adult   Pulse: 75   SpO2: 98%   Weight: 252 lb 6.4 oz (114.5 kg)   Height: 6' 1\" (1.854 m)     Physical Exam  Constitutional:       General: He is not in acute distress. Appearance: Normal appearance. He is obese. He is not ill-appearing, toxic-appearing or diaphoretic. HENT:      Head: Normocephalic and atraumatic. Right Ear: External ear normal.      Left Ear: External ear normal.      Nose: Nose normal. No congestion or rhinorrhea. Eyes:      Extraocular Movements: Extraocular movements intact.       Conjunctiva/sclera: Conjunctivae normal.      Pupils: Pupils are

## 2023-08-25 ENCOUNTER — OFFICE VISIT (OUTPATIENT)
Dept: FAMILY MEDICINE CLINIC | Age: 38
End: 2023-08-25
Payer: COMMERCIAL

## 2023-08-25 VITALS
OXYGEN SATURATION: 100 % | WEIGHT: 241.8 LBS | DIASTOLIC BLOOD PRESSURE: 86 MMHG | HEART RATE: 89 BPM | HEIGHT: 73 IN | SYSTOLIC BLOOD PRESSURE: 124 MMHG | BODY MASS INDEX: 32.05 KG/M2

## 2023-08-25 DIAGNOSIS — E66.9 CLASS 1 OBESITY WITH SERIOUS COMORBIDITY AND BODY MASS INDEX (BMI) OF 33.0 TO 33.9 IN ADULT, UNSPECIFIED OBESITY TYPE: ICD-10-CM

## 2023-08-25 DIAGNOSIS — R73.03 PREDIABETES: Primary | ICD-10-CM

## 2023-08-25 PROCEDURE — 1036F TOBACCO NON-USER: CPT | Performed by: NURSE PRACTITIONER

## 2023-08-25 PROCEDURE — 99213 OFFICE O/P EST LOW 20 MIN: CPT | Performed by: NURSE PRACTITIONER

## 2023-08-25 PROCEDURE — G8427 DOCREV CUR MEDS BY ELIG CLIN: HCPCS | Performed by: NURSE PRACTITIONER

## 2023-08-25 PROCEDURE — G8417 CALC BMI ABV UP PARAM F/U: HCPCS | Performed by: NURSE PRACTITIONER

## 2023-08-25 RX ORDER — PHENTERMINE HYDROCHLORIDE 37.5 MG/1
37.5 TABLET ORAL
Qty: 30 TABLET | Refills: 0 | Status: SHIPPED | OUTPATIENT
Start: 2023-08-25 | End: 2023-09-24

## 2023-08-25 ASSESSMENT — ENCOUNTER SYMPTOMS
COLOR CHANGE: 0
COUGH: 0
TROUBLE SWALLOWING: 0
CONSTIPATION: 0
ABDOMINAL PAIN: 0
EYE PAIN: 0
SHORTNESS OF BREATH: 0
CHEST TIGHTNESS: 0
DIARRHEA: 0
BACK PAIN: 1

## 2023-08-25 NOTE — PROGRESS NOTES
Subjective  Chief Complaint   Patient presents with    Weight Management     1 month check up       HPI    Pt here for 1 month follow up on weight and prediabetes. Doing very well overall. Has lost 11 pounds this past month. Not exercising as much, pulled a muscle in his back so has not been able to exercise as much. Cut calories down significantly. Once per week will wake up after 2 hours of sleep, but is able to fall back to sleep. 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 Social History     Socioeconomic History    Marital status:      Spouse name: None    Number of children: None    Years of education: None    Highest education level: None   Tobacco Use    Smoking status: Never    Smokeless tobacco: Never   Substance and Sexual Activity    Alcohol use: Not Currently     Comment: rarely    Drug use: No    Sexual activity: Not Currently     Social Determinants of Health     Financial Resource Strain: Low Risk     Difficulty of Paying Living Expenses: Not hard at all   Food Insecurity: No Food Insecurity    Worried About [place] in the Last Year: Never true    Ran Out of Food in the Last Year: Never true   Transportation Needs: Unknown    Lack of Transportation (Non-Medical): No   Housing Stability: Unknown    Unstable Housing in the Last Year: No     No current outpatient medications on file prior to visit. No current facility-administered medications on file prior to visit.      Allergies

## 2023-09-11 ENCOUNTER — ANESTHESIA (OUTPATIENT)
Dept: OPERATING ROOM | Age: 38
DRG: 342 | End: 2023-09-11

## 2023-09-11 ENCOUNTER — HOSPITAL ENCOUNTER (INPATIENT)
Age: 38
LOS: 1 days | Discharge: HOME | DRG: 342 | End: 2023-09-11
Attending: SURGERY | Admitting: SURGERY

## 2023-09-11 ENCOUNTER — APPOINTMENT (OUTPATIENT)
Dept: CT IMAGING | Age: 38
DRG: 342 | End: 2023-09-11

## 2023-09-11 ENCOUNTER — ANESTHESIA EVENT (OUTPATIENT)
Dept: OPERATING ROOM | Age: 38
DRG: 342 | End: 2023-09-11

## 2023-09-11 VITALS
HEART RATE: 90 BPM | BODY MASS INDEX: 30.48 KG/M2 | OXYGEN SATURATION: 98 % | DIASTOLIC BLOOD PRESSURE: 66 MMHG | TEMPERATURE: 98.8 F | SYSTOLIC BLOOD PRESSURE: 109 MMHG | HEIGHT: 73 IN | RESPIRATION RATE: 16 BRPM | WEIGHT: 230 LBS

## 2023-09-11 DIAGNOSIS — K35.80 ACUTE APPENDICITIS: ICD-10-CM

## 2023-09-11 DIAGNOSIS — K35.20 ACUTE APPENDICITIS WITH PERFORATION AND GENERALIZED PERITONITIS, WITHOUT ABSCESS OR GANGRENE: Primary | ICD-10-CM

## 2023-09-11 PROBLEM — G89.18 ACUTE POST-OPERATIVE PAIN: Status: ACTIVE | Noted: 2023-09-11

## 2023-09-11 LAB
ABO + RH BLD: NORMAL
ALBUMIN SERPL-MCNC: 4.3 G/DL (ref 3.5–4.6)
ALP SERPL-CCNC: 119 U/L (ref 35–104)
ALT SERPL-CCNC: 73 U/L (ref 0–41)
ANION GAP SERPL CALCULATED.3IONS-SCNC: 15 MEQ/L (ref 9–15)
AST SERPL-CCNC: 93 U/L (ref 0–40)
BASOPHILS # BLD: 0 K/UL (ref 0–0.2)
BASOPHILS NFR BLD: 0 %
BILIRUB SERPL-MCNC: 0.8 MG/DL (ref 0.2–0.7)
BLD GP AB SCN SERPL QL: NORMAL
BUN SERPL-MCNC: 17 MG/DL (ref 6–20)
CALCIUM SERPL-MCNC: 9.3 MG/DL (ref 8.5–9.9)
CHLORIDE SERPL-SCNC: 102 MEQ/L (ref 95–107)
CO2 SERPL-SCNC: 22 MEQ/L (ref 20–31)
CREAT SERPL-MCNC: 1.11 MG/DL (ref 0.7–1.2)
EOSINOPHIL # BLD: 0 K/UL (ref 0–0.7)
EOSINOPHIL NFR BLD: 0 %
ERYTHROCYTE [DISTWIDTH] IN BLOOD BY AUTOMATED COUNT: 13.1 % (ref 11.5–14.5)
GLOBULIN SER CALC-MCNC: 2.4 G/DL (ref 2.3–3.5)
GLUCOSE SERPL-MCNC: 134 MG/DL (ref 70–99)
HCT VFR BLD AUTO: 41.7 % (ref 42–52)
HGB BLD-MCNC: 14.2 G/DL (ref 14–18)
LACTATE BLDV-SCNC: 3 MMOL/L (ref 0.5–2.2)
LIPASE SERPL-CCNC: 18 U/L (ref 12–95)
LYMPHOCYTES # BLD: 0.2 K/UL (ref 1–4.8)
LYMPHOCYTES NFR BLD: 1 %
MCH RBC QN AUTO: 28.8 PG (ref 27–31.3)
MCHC RBC AUTO-ENTMCNC: 34 % (ref 33–37)
MCV RBC AUTO: 84.6 FL (ref 79–92.2)
METAMYELOCYTES NFR BLD MANUAL: 2 %
MONOCYTES # BLD: 0.3 K/UL (ref 0.2–0.8)
MONOCYTES NFR BLD: 2 %
NEUTROPHILS # BLD: 14.6 K/UL (ref 1.4–6.5)
NEUTS BAND NFR BLD MANUAL: 8 %
NEUTS SEG NFR BLD: 87 %
PLATELET # BLD AUTO: 209 K/UL (ref 130–400)
PLATELET BLD QL SMEAR: ADEQUATE
POTASSIUM SERPL-SCNC: 3.4 MEQ/L (ref 3.4–4.9)
PROT SERPL-MCNC: 6.7 G/DL (ref 6.3–8)
RBC # BLD AUTO: 4.93 M/UL (ref 4.7–6.1)
RBC MORPH BLD: NORMAL
SODIUM SERPL-SCNC: 139 MEQ/L (ref 135–144)
WBC # BLD AUTO: 15 K/UL (ref 4.8–10.8)

## 2023-09-11 PROCEDURE — 7100000001 HC PACU RECOVERY - ADDTL 15 MIN: Performed by: SURGERY

## 2023-09-11 PROCEDURE — 6360000002 HC RX W HCPCS: Performed by: ANESTHESIOLOGY

## 2023-09-11 PROCEDURE — 99235 HOSP IP/OBS SAME DATE MOD 70: CPT | Performed by: SURGERY

## 2023-09-11 PROCEDURE — A4217 STERILE WATER/SALINE, 500 ML: HCPCS | Performed by: SURGERY

## 2023-09-11 PROCEDURE — 6360000004 HC RX CONTRAST MEDICATION: Performed by: PHYSICIAN ASSISTANT

## 2023-09-11 PROCEDURE — 7100000010 HC PHASE II RECOVERY - FIRST 15 MIN: Performed by: SURGERY

## 2023-09-11 PROCEDURE — 2580000003 HC RX 258: Performed by: SURGERY

## 2023-09-11 PROCEDURE — 86900 BLOOD TYPING SEROLOGIC ABO: CPT

## 2023-09-11 PROCEDURE — 86901 BLOOD TYPING SEROLOGIC RH(D): CPT

## 2023-09-11 PROCEDURE — 36415 COLL VENOUS BLD VENIPUNCTURE: CPT

## 2023-09-11 PROCEDURE — 83690 ASSAY OF LIPASE: CPT

## 2023-09-11 PROCEDURE — 2720000010 HC SURG SUPPLY STERILE: Performed by: SURGERY

## 2023-09-11 PROCEDURE — 2580000003 HC RX 258: Performed by: ANESTHESIOLOGY

## 2023-09-11 PROCEDURE — 44970 LAPAROSCOPY APPENDECTOMY: CPT | Performed by: SURGERY

## 2023-09-11 PROCEDURE — 99285 EMERGENCY DEPT VISIT HI MDM: CPT

## 2023-09-11 PROCEDURE — 86850 RBC ANTIBODY SCREEN: CPT

## 2023-09-11 PROCEDURE — 3700000001 HC ADD 15 MINUTES (ANESTHESIA): Performed by: SURGERY

## 2023-09-11 PROCEDURE — 85025 COMPLETE CBC W/AUTO DIFF WBC: CPT

## 2023-09-11 PROCEDURE — 3700000000 HC ANESTHESIA ATTENDED CARE: Performed by: SURGERY

## 2023-09-11 PROCEDURE — 7100000011 HC PHASE II RECOVERY - ADDTL 15 MIN: Performed by: SURGERY

## 2023-09-11 PROCEDURE — 83605 ASSAY OF LACTIC ACID: CPT

## 2023-09-11 PROCEDURE — 88304 TISSUE EXAM BY PATHOLOGIST: CPT

## 2023-09-11 PROCEDURE — 0DTJ4ZZ RESECTION OF APPENDIX, PERCUTANEOUS ENDOSCOPIC APPROACH: ICD-10-PCS | Performed by: SURGERY

## 2023-09-11 PROCEDURE — 7100000000 HC PACU RECOVERY - FIRST 15 MIN: Performed by: SURGERY

## 2023-09-11 PROCEDURE — 6360000002 HC RX W HCPCS: Performed by: REGISTERED NURSE

## 2023-09-11 PROCEDURE — 2580000003 HC RX 258: Performed by: PHYSICIAN ASSISTANT

## 2023-09-11 PROCEDURE — 80053 COMPREHEN METABOLIC PANEL: CPT

## 2023-09-11 PROCEDURE — 3600000013 HC SURGERY LEVEL 3 ADDTL 15MIN: Performed by: SURGERY

## 2023-09-11 PROCEDURE — 2500000003 HC RX 250 WO HCPCS: Performed by: ANESTHESIOLOGY

## 2023-09-11 PROCEDURE — 2500000003 HC RX 250 WO HCPCS: Performed by: REGISTERED NURSE

## 2023-09-11 PROCEDURE — 3600000003 HC SURGERY LEVEL 3 BASE: Performed by: SURGERY

## 2023-09-11 PROCEDURE — 6360000002 HC RX W HCPCS: Performed by: PHYSICIAN ASSISTANT

## 2023-09-11 PROCEDURE — 74177 CT ABD & PELVIS W/CONTRAST: CPT

## 2023-09-11 PROCEDURE — 6370000000 HC RX 637 (ALT 250 FOR IP): Performed by: ANESTHESIOLOGY

## 2023-09-11 PROCEDURE — 64488 TAP BLOCK BI INJECTION: CPT | Performed by: ANESTHESIOLOGY

## 2023-09-11 PROCEDURE — 2709999900 HC NON-CHARGEABLE SUPPLY: Performed by: SURGERY

## 2023-09-11 RX ORDER — SODIUM CHLORIDE 9 MG/ML
INJECTION, SOLUTION INTRAVENOUS PRN
Status: DISCONTINUED | OUTPATIENT
Start: 2023-09-11 | End: 2023-09-11 | Stop reason: HOSPADM

## 2023-09-11 RX ORDER — SODIUM CHLORIDE 0.9 % (FLUSH) 0.9 %
5-40 SYRINGE (ML) INJECTION EVERY 12 HOURS SCHEDULED
Status: DISCONTINUED | OUTPATIENT
Start: 2023-09-11 | End: 2023-09-11 | Stop reason: HOSPADM

## 2023-09-11 RX ORDER — PROPOFOL 10 MG/ML
INJECTION, EMULSION INTRAVENOUS PRN
Status: DISCONTINUED | OUTPATIENT
Start: 2023-09-11 | End: 2023-09-11 | Stop reason: SDUPTHER

## 2023-09-11 RX ORDER — DIPHENHYDRAMINE HYDROCHLORIDE 50 MG/ML
12.5 INJECTION INTRAMUSCULAR; INTRAVENOUS
Status: DISCONTINUED | OUTPATIENT
Start: 2023-09-11 | End: 2023-09-11 | Stop reason: HOSPADM

## 2023-09-11 RX ORDER — 0.9 % SODIUM CHLORIDE 0.9 %
1000 INTRAVENOUS SOLUTION INTRAVENOUS ONCE
Status: COMPLETED | OUTPATIENT
Start: 2023-09-11 | End: 2023-09-11

## 2023-09-11 RX ORDER — ROCURONIUM BROMIDE 10 MG/ML
INJECTION, SOLUTION INTRAVENOUS PRN
Status: DISCONTINUED | OUTPATIENT
Start: 2023-09-11 | End: 2023-09-11 | Stop reason: SDUPTHER

## 2023-09-11 RX ORDER — OXYCODONE HYDROCHLORIDE 5 MG/1
5 TABLET ORAL
Status: COMPLETED | OUTPATIENT
Start: 2023-09-11 | End: 2023-09-11

## 2023-09-11 RX ORDER — OXYCODONE HYDROCHLORIDE 5 MG/1
5 TABLET ORAL EVERY 6 HOURS PRN
Qty: 12 TABLET | Refills: 0 | Status: SHIPPED | OUTPATIENT
Start: 2023-09-11 | End: 2023-09-14

## 2023-09-11 RX ORDER — METRONIDAZOLE 500 MG/100ML
500 INJECTION, SOLUTION INTRAVENOUS ONCE
Status: COMPLETED | OUTPATIENT
Start: 2023-09-11 | End: 2023-09-11

## 2023-09-11 RX ORDER — ESMOLOL HYDROCHLORIDE 10 MG/ML
INJECTION INTRAVENOUS PRN
Status: DISCONTINUED | OUTPATIENT
Start: 2023-09-11 | End: 2023-09-11 | Stop reason: SDUPTHER

## 2023-09-11 RX ORDER — SODIUM CHLORIDE 0.9 % (FLUSH) 0.9 %
5-40 SYRINGE (ML) INJECTION PRN
Status: DISCONTINUED | OUTPATIENT
Start: 2023-09-11 | End: 2023-09-11 | Stop reason: HOSPADM

## 2023-09-11 RX ORDER — FENTANYL CITRATE 0.05 MG/ML
50 INJECTION, SOLUTION INTRAMUSCULAR; INTRAVENOUS EVERY 10 MIN PRN
Status: DISCONTINUED | OUTPATIENT
Start: 2023-09-11 | End: 2023-09-11 | Stop reason: HOSPADM

## 2023-09-11 RX ORDER — ONDANSETRON 2 MG/ML
4 INJECTION INTRAMUSCULAR; INTRAVENOUS
Status: DISCONTINUED | OUTPATIENT
Start: 2023-09-11 | End: 2023-09-11 | Stop reason: HOSPADM

## 2023-09-11 RX ORDER — BUPIVACAINE HYDROCHLORIDE AND EPINEPHRINE 5; 5 MG/ML; UG/ML
INJECTION, SOLUTION EPIDURAL; INTRACAUDAL; PERINEURAL PRN
Status: DISCONTINUED | OUTPATIENT
Start: 2023-09-11 | End: 2023-09-11 | Stop reason: SDUPTHER

## 2023-09-11 RX ORDER — ONDANSETRON 2 MG/ML
4 INJECTION INTRAMUSCULAR; INTRAVENOUS ONCE
Status: DISCONTINUED | OUTPATIENT
Start: 2023-09-11 | End: 2023-09-11 | Stop reason: HOSPADM

## 2023-09-11 RX ORDER — CIPROFLOXACIN 2 MG/ML
400 INJECTION, SOLUTION INTRAVENOUS ONCE
Status: COMPLETED | OUTPATIENT
Start: 2023-09-11 | End: 2023-09-11

## 2023-09-11 RX ORDER — ONDANSETRON 2 MG/ML
INJECTION INTRAMUSCULAR; INTRAVENOUS PRN
Status: DISCONTINUED | OUTPATIENT
Start: 2023-09-11 | End: 2023-09-11 | Stop reason: SDUPTHER

## 2023-09-11 RX ORDER — SODIUM CHLORIDE, SODIUM LACTATE, POTASSIUM CHLORIDE, CALCIUM CHLORIDE 600; 310; 30; 20 MG/100ML; MG/100ML; MG/100ML; MG/100ML
INJECTION, SOLUTION INTRAVENOUS CONTINUOUS
Status: DISCONTINUED | OUTPATIENT
Start: 2023-09-11 | End: 2023-09-11 | Stop reason: HOSPADM

## 2023-09-11 RX ORDER — MAGNESIUM HYDROXIDE 1200 MG/15ML
LIQUID ORAL CONTINUOUS PRN
Status: DISCONTINUED | OUTPATIENT
Start: 2023-09-11 | End: 2023-09-11 | Stop reason: HOSPADM

## 2023-09-11 RX ORDER — MIDAZOLAM HYDROCHLORIDE 1 MG/ML
INJECTION INTRAMUSCULAR; INTRAVENOUS PRN
Status: DISCONTINUED | OUTPATIENT
Start: 2023-09-11 | End: 2023-09-11 | Stop reason: SDUPTHER

## 2023-09-11 RX ORDER — METOCLOPRAMIDE HYDROCHLORIDE 5 MG/ML
10 INJECTION INTRAMUSCULAR; INTRAVENOUS
Status: DISCONTINUED | OUTPATIENT
Start: 2023-09-11 | End: 2023-09-11 | Stop reason: HOSPADM

## 2023-09-11 RX ORDER — ONDANSETRON 4 MG/1
4 TABLET, ORALLY DISINTEGRATING ORAL EVERY 8 HOURS PRN
Status: DISCONTINUED | OUTPATIENT
Start: 2023-09-11 | End: 2023-09-11 | Stop reason: HOSPADM

## 2023-09-11 RX ORDER — ONDANSETRON 2 MG/ML
4 INJECTION INTRAMUSCULAR; INTRAVENOUS EVERY 6 HOURS PRN
Status: DISCONTINUED | OUTPATIENT
Start: 2023-09-11 | End: 2023-09-11 | Stop reason: HOSPADM

## 2023-09-11 RX ORDER — MEPERIDINE HYDROCHLORIDE 25 MG/ML
12.5 INJECTION INTRAMUSCULAR; INTRAVENOUS; SUBCUTANEOUS
Status: DISCONTINUED | OUTPATIENT
Start: 2023-09-11 | End: 2023-09-11 | Stop reason: HOSPADM

## 2023-09-11 RX ADMIN — CIPROFLOXACIN 400 MG: 2 INJECTION, SOLUTION INTRAVENOUS at 08:42

## 2023-09-11 RX ADMIN — PROPOFOL 200 MG: 10 INJECTION, EMULSION INTRAVENOUS at 11:40

## 2023-09-11 RX ADMIN — ROCURONIUM BROMIDE 50 MG: 10 INJECTION INTRAVENOUS at 11:40

## 2023-09-11 RX ADMIN — ESMOLOL HYDROCHLORIDE 20 MG: 100 INJECTION, SOLUTION INTRAVENOUS at 12:13

## 2023-09-11 RX ADMIN — FENTANYL CITRATE 50 MCG: 50 INJECTION INTRAMUSCULAR; INTRAVENOUS at 13:30

## 2023-09-11 RX ADMIN — FENTANYL CITRATE 50 MCG: 50 INJECTION INTRAMUSCULAR; INTRAVENOUS at 13:16

## 2023-09-11 RX ADMIN — MIDAZOLAM HYDROCHLORIDE 2 MG: 1 INJECTION, SOLUTION INTRAMUSCULAR; INTRAVENOUS at 10:24

## 2023-09-11 RX ADMIN — METRONIDAZOLE 500 MG: 500 INJECTION, SOLUTION INTRAVENOUS at 08:38

## 2023-09-11 RX ADMIN — OXYCODONE HYDROCHLORIDE 5 MG: 5 TABLET ORAL at 14:23

## 2023-09-11 RX ADMIN — SODIUM CHLORIDE, POTASSIUM CHLORIDE, SODIUM LACTATE AND CALCIUM CHLORIDE: 600; 310; 30; 20 INJECTION, SOLUTION INTRAVENOUS at 10:08

## 2023-09-11 RX ADMIN — ONDANSETRON 4 MG: 2 INJECTION INTRAMUSCULAR; INTRAVENOUS at 12:13

## 2023-09-11 RX ADMIN — BUPIVACAINE HYDROCHLORIDE AND EPINEPHRINE BITARTRATE 30 ML: 5; .005 INJECTION, SOLUTION EPIDURAL; INTRACAUDAL; PERINEURAL at 10:27

## 2023-09-11 RX ADMIN — IOPAMIDOL 50 ML: 612 INJECTION, SOLUTION INTRAVENOUS at 07:51

## 2023-09-11 RX ADMIN — FENTANYL CITRATE 50 MCG: 50 INJECTION INTRAMUSCULAR; INTRAVENOUS at 12:49

## 2023-09-11 RX ADMIN — SODIUM CHLORIDE 1000 ML: 9 INJECTION, SOLUTION INTRAVENOUS at 07:33

## 2023-09-11 ASSESSMENT — PAIN DESCRIPTION - ONSET: ONSET: ON-GOING

## 2023-09-11 ASSESSMENT — PAIN DESCRIPTION - LOCATION
LOCATION: ABDOMEN

## 2023-09-11 ASSESSMENT — PAIN DESCRIPTION - ORIENTATION
ORIENTATION: MID
ORIENTATION: RIGHT;LOWER
ORIENTATION: MID
ORIENTATION: MID

## 2023-09-11 ASSESSMENT — ENCOUNTER SYMPTOMS
ABDOMINAL DISTENTION: 0
VOMITING: 0
CONSTIPATION: 0
RHINORRHEA: 0
SHORTNESS OF BREATH: 0
ABDOMINAL PAIN: 1
EYE DISCHARGE: 0
SORE THROAT: 0
NAUSEA: 1
COLOR CHANGE: 0

## 2023-09-11 ASSESSMENT — PAIN DESCRIPTION - FREQUENCY: FREQUENCY: CONTINUOUS

## 2023-09-11 ASSESSMENT — PAIN SCALES - GENERAL
PAINLEVEL_OUTOF10: 6
PAINLEVEL_OUTOF10: 5
PAINLEVEL_OUTOF10: 5
PAINLEVEL_OUTOF10: 2
PAINLEVEL_OUTOF10: 10
PAINLEVEL_OUTOF10: 4
PAINLEVEL_OUTOF10: 5

## 2023-09-11 ASSESSMENT — PAIN DESCRIPTION - DESCRIPTORS
DESCRIPTORS: PRESSURE;SHARP
DESCRIPTORS: BURNING
DESCRIPTORS: PATIENT UNABLE TO DESCRIBE
DESCRIPTORS: BURNING
DESCRIPTORS: BURNING

## 2023-09-11 ASSESSMENT — PAIN - FUNCTIONAL ASSESSMENT
PAIN_FUNCTIONAL_ASSESSMENT: 0-10
PAIN_FUNCTIONAL_ASSESSMENT: PREVENTS OR INTERFERES SOME ACTIVE ACTIVITIES AND ADLS

## 2023-09-11 ASSESSMENT — PAIN DESCRIPTION - PAIN TYPE: TYPE: SURGICAL PAIN

## 2023-09-11 ASSESSMENT — LIFESTYLE VARIABLES
HOW MANY STANDARD DRINKS CONTAINING ALCOHOL DO YOU HAVE ON A TYPICAL DAY: 1 OR 2
HOW OFTEN DO YOU HAVE A DRINK CONTAINING ALCOHOL: 2-4 TIMES A MONTH

## 2023-09-11 NOTE — ED TRIAGE NOTES
ABD pain that's started yesterday. RLQ. Denies urinary issues. Has had pain before and didn't have answers.  Didn't do recommend colonoscopy

## 2023-09-11 NOTE — PROGRESS NOTES
Pt discharged to friend. All personal belongings, discharge instructions and RX x1 taken with patient.

## 2023-09-11 NOTE — ANESTHESIA POSTPROCEDURE EVALUATION
Department of Anesthesiology  Postprocedure Note    Patient: Arvind Beck  MRN: [id number]  YOB: 1985  Date of evaluation: 9/11/2023      Procedure Summary     Date: 09/11/23 Room / Location: [address]    Anesthesia Start: 1137 Anesthesia Stop: 1246    Procedure: APPENDECTOMY LAPAROSCOPIC (Abdomen) Diagnosis:       Acute appendicitis      (Acute appendicitis [K35.80])    Surgeons: Alex Cerda MD Responsible Provider: Shan Salcedo MD    Anesthesia Type: general ASA Status: 1 - Emergent          Anesthesia Type: No value filed.     Stan Phase I: Stan Score: 8    Stan Phase II:        Anesthesia Post Evaluation    Patient location during evaluation: bedside  Patient participation: complete - patient participated  Level of consciousness: awake and awake and alert  Airway patency: patent  Nausea & Vomiting: no nausea and no vomiting  Complications: no  Cardiovascular status: blood pressure returned to baseline and hemodynamically stable  Respiratory status: acceptable  Hydration status: euvolemic  Pain management: adequate

## 2023-09-11 NOTE — PROGRESS NOTES
Call placed with trauma PA. Advised PA of pt eating, drinking, and voiding without complications. PA ok with patient being discharged.

## 2023-09-11 NOTE — DISCHARGE SUMMARY
7 [facility], [address] [place] [address] Beatriz Roberts  MRN: [id number]  YOB: 1985  43 y.o.male      Attending  Alessandra Lazo MD ?   Date of Admission  9/11/2023 Date of Discharge  09/11/23      ? DIAGNOSES:  Principal Problem:    Acute appendicitis  Active Problems:    Acute post-operative pain  Resolved Problems:    * No resolved hospital problems. *         PROCEDURES:  9/11/2023: Appendectomy Laparoscopic by Dr. Ye Lomax MD.   ? DISCHARGE MEDICATIONS:   Current Discharge Medication List             Details   oxyCODONE (ROXICODONE) 5 MG immediate release tablet Take 1 tablet by mouth every 6 hours as needed for Pain for up to 3 days. Intended supply: 3 days. Take lowest dose possible to manage pain Max Daily Amount: 20 mg  Qty: 12 tablet, Refills: 0    Comments: Reduce doses taken as pain becomes manageable  Associated Diagnoses: Acute appendicitis with perforation and generalized peritonitis, without abscess or gangrene                Details   phentermine (ADIPEX-P) 37.5 MG tablet Take 1 tablet by mouth every morning (before breakfast) for 30 days. Max Daily Amount: 37.5 mg  Qty: 30 tablet, Refills: 0    Comments: BMI 31.90  Associated Diagnoses: Class 1 obesity with serious comorbidity and body mass index (BMI) of 33.0 to 33.9 in adult, unspecified obesity type; Prediabetes                 REASON FOR HOSPITALIZATION:  Beatriz Roberts is a 43 y.o. male with recurrent RLQ abdominal pain. The patient had a similar episode 3 months ago. He came to the ED and was evaluated, but CT was read as no acute pathology. He followed up with GI, but never had a colonoscopy. This presentation is similar to his last.  The pain started yesterday. Last meal was yesterday evening. No significant past medical history. On review of last CT scan in June, it appears he had a dilated appendix at that time as well making this recurrent appendicitis.   The option of

## 2023-09-11 NOTE — ANESTHESIA PRE PROCEDURE
discussed with CRNA.     Attending anesthesiologist reviewed and agrees with Preprocedure content      Post-op pain plan if not by surgeon: single peripheral nerve block            Saúl Ricardo MD   9/11/2023

## 2023-09-11 NOTE — PROGRESS NOTES
Pt sitting in high folwer's position drinking water and eating crackers with no c/o of nausea or vomiting. Will continue to monitor.

## 2023-09-11 NOTE — DISCHARGE INSTRUCTIONS
Discharge Instructions    Date of admission: 9/11/2023  Date of discharge: 09/11/23      You are being discharged to home    Follow up:  - Please call to schedule your follow-up appointments - information provided separately. - You will need to follow up with:  - Follow up with emergency general surgery/trauma on 9/20/2023 for a post op evaluation. Call to make an appointment. - See below for information regarding contacting your primary care physician or establishing care with [facility]) if you do not already have one. Restrictions:  - Do not lift, push, pull, or drag anything greater than 10lbs  - This helps to prevent hernia formation      Pain control:   - Take Tylenol 325 mg, 1-2 tablets every 4 hours as needed for mild to moderate pain. Mild to moderate pain is characterized by pain 1-6 out of 10 on a pain scale. - Take Oxycodone 5 mg, 1 tablet every 6 hours for as needed for severe pain. Severe pain is characterized as pain of 7-10 out of 10 on a pain scale. - It is okay to take Oxycodone and Tylenol together if needed. - Please begin to wean off of your pain medications as soon as possible. - To do this, start taking Oxycodone every 8 hours instead of every 6, then every 12 hours, then only once per day if needed. Then stop. - You may use Tylenol until your pain is diminished enough for you to tolerate your pain. - Your pain medication may be adjusted or discontinued in follow-up appointments, or by the supervising physician in an inpatient rehab setting.   - Please do not drive within 24 hours of taking Oxycodone or any Opiate medication. Wound Care and Showering/Bathing:  - Okay to shower daily -- allow soap and water to run down any incisions sites. Do not scrub the area. - If you cannot maintain your balance in the shower, then you should not shower.  Sponge baths are the best way to maintain hygiene while your are healing.  - To sponge bath, wet a washcloth with soapy water and

## 2023-09-11 NOTE — OP NOTE
Operative Note      Patient: Rocío López  YOB: 1985  MRN: [id number]    Date of Procedure: 9/11/2023    Pre-Op Diagnosis Codes:     * Acute appendicitis [K35.80]    Post-Op Diagnosis: Same       Procedure(s):  APPENDECTOMY LAPAROSCOPIC    Surgeon(s):  Arline Essex, MD    Assistant:   Physician Assistant: Piedad Falcon PA-C    Anesthesia: General    Estimated Blood Loss (mL): Minimal    Complications: None    Specimens:   ID Type Source Tests Collected by Time Destination   A : Appendix Tissue Appendix SURGICAL PATHOLOGY Arline Essex, MD 9/11/2023 1217        Implants:  * No implants in log *      Drains: * No LDAs found *    Findings: Inflamed appendix    Detailed Description of Procedure:   After informed consent was obtained, the patient was brought to the operating room, placed supine on the operating room table. Anesthesia was induced. The patient was intubated. His left arm was tucked at the side. A denny catheter was placed to minimze risk of injury to the bladder. The abdomen was prepped and draped in the usual sterile fashion. A time-out was performed by all parties involved. Cipro/flagyl had been given in the ED as preoperative antibiotic. SCDs were used for DVT prophylaxis. We began by making an supraumbilical incision in a vertical fashion just above the umbilicus. We dissected down to the level of the fascia. The fascia was grabbed with Kocher clamps. The fascia was then incised and UR6 needles were used to place stay sutures. The peritoneal cavity was entered bluntly and a Lelia trocar was inserted. The abdomen was subsequently insufflated with CO2 gas. Next a 5 mm port was placed in the suprapubic area and in the left lower quadrant. The laparoscope was inserted and the abdomen inspected without gross pathology, with the exception of an inflammatory mass in the right lower quadrant.  With the use of atraumatic graspers, the omentum and small bowel were moved

## 2023-09-11 NOTE — PLAN OF CARE
9/11/2023        RE: Gabi Yan         [id number] [facility] [id number]          To Whom It May Concern,      Due to medical reasons, Gabi Yan will be excused from work from 9/11/2023 to 9/20/2023. Should he return to work earlier, he may not participate in any heavy lifting. He may not lift anything >10bs.          Sincerely,          Mariana Hassan PA-C

## 2023-09-11 NOTE — ED PROVIDER NOTES
Neurological:  Negative for dizziness, syncope, numbness and headaches. Psychiatric/Behavioral:  Negative for agitation and confusion. Except as noted above the remainder of the review of systems was reviewed and negative. 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 Max Daily Amount: 37.5 mg       ALLERGIES     Ibuprofen, Penicillins, Sulfa antibiotics, and Sulphadimidine [sulfamethazine]    FAMILY HISTORY     History reviewed. No pertinent family history.        SOCIAL HISTORY       Social History     Socioeconomic History    Marital status:      Spouse name: None    Number of children: None    Years of education: None    Highest education level: None   Tobacco Use    Smoking status: Never    Smokeless tobacco: Never   Substance and Sexual Activity    Alcohol use: Not Currently     Comment: rarely    Drug use: No    Sexual activity: Not Currently     Social Determinants of Health     Financial Resource Strain: Low Risk  (6/23/2023)    Overall Financial Resource Strain (CARDIA)     Difficulty of Paying Living Expenses: Not hard at all   Food Insecurity: No Food Insecurity (6/23/2023)    Hunger Vital Sign     Worried About Running Out of Food in the Last Year: Never true     Ran Out of Food in the Last Year: Never true   Transportation Needs: Unknown (6/23/2023)    PRAPARE - Transportation     Lack of Transportation

## 2023-09-11 NOTE — ANESTHESIA PROCEDURE NOTES
Peripheral Block    Patient location during procedure: pre-op  Reason for block: post-op pain management and at surgeon's request  Start time: 9/11/2023 10:25 AM  End time: 9/11/2023 11:30 AM  Staffing  Performed: anesthesiologist   Anesthesiologist: Genia Urbina MD  Performed by: Genia Urbina MD  Authorized by: Genia Urbina MD    Preanesthetic Checklist  Completed: patient identified, IV checked, site marked, risks and benefits discussed, surgical/procedural consents, equipment checked, pre-op evaluation, timeout performed, anesthesia consent given, oxygen available and monitors applied/VS acknowledged  Peripheral Block   Patient position: supine  Prep: ChloraPrep  Provider prep: mask and sterile gloves (Sterile probe cover)  Patient monitoring: cardiac monitor, continuous pulse ox, frequent blood pressure checks and IV access  Block type: TAP  Laterality: bilateral  Injection technique: single-shot  Guidance: ultrasound guided  Local infiltration: bupivacaine (20 ml of NS added)  Infiltration strength: 0.5 %  Local infiltration: bupivacaine (20 ml of NS added)  Dose: 30 mL    Needle   Needle type: combined needle/nerve stimulator   Needle gauge: 22 G  Needle localization: anatomical landmarks and ultrasound guidance  Needle length: 5 cm  Assessment   Injection assessment: negative aspiration for heme, no paresthesia on injection and local visualized surrounding nerve on ultrasound  Paresthesia pain: immediately resolved  Slow fractionated injection: yes  Hemodynamics: stable  Real-time US image taken/store: yes    Additional Notes  Ultrasound image printed and saved in patient chart.     Sterile probe cover used

## 2023-09-12 ENCOUNTER — TELEPHONE (OUTPATIENT)
Dept: FAMILY MEDICINE CLINIC | Age: 38
End: 2023-09-12

## 2023-09-12 NOTE — TELEPHONE ENCOUNTER
Care Transitions Initial Follow Up Call    Outreach made within 2 business days of discharge: Yes    Patient: Rocío López Patient : 1985   MRN: [id number]  Reason for Admission: There are no discharge diagnoses documented for the most recent discharge. Discharge Date: 23       Spoke with: pt    Discharge department/facility: [facility]    [facility] Interactive Patient Contact:  Was patient able to fill all prescriptions: No: waiting on pharmacy   Was patient instructed to bring all medications to the follow-up visit: Yes  Is patient taking all medications as directed in the discharge summary? Yes  Does patient understand their discharge instructions: Yes  Does patient have questions or concerns that need addressed prior to 7-14 day follow up office visit: no    Declined appt     Scheduled appointment with PCP within 7-14 days    Follow Up  No future appointments.     Jason Wolff MA

## 2023-09-13 LAB
PERFORMED ON: NORMAL
POC CREATININE: 1.1 MG/DL (ref 0.8–1.3)
POC SAMPLE TYPE: NORMAL

## 2024-02-16 ENCOUNTER — OFFICE VISIT (OUTPATIENT)
Dept: FAMILY MEDICINE CLINIC | Age: 39
End: 2024-02-16

## 2024-02-16 VITALS
HEART RATE: 94 BPM | BODY MASS INDEX: 33.93 KG/M2 | WEIGHT: 256 LBS | DIASTOLIC BLOOD PRESSURE: 80 MMHG | SYSTOLIC BLOOD PRESSURE: 134 MMHG | OXYGEN SATURATION: 98 % | HEIGHT: 73 IN

## 2024-02-16 DIAGNOSIS — R73.03 PREDIABETES: Primary | ICD-10-CM

## 2024-02-16 DIAGNOSIS — E66.9 CLASS 1 OBESITY WITH SERIOUS COMORBIDITY AND BODY MASS INDEX (BMI) OF 33.0 TO 33.9 IN ADULT, UNSPECIFIED OBESITY TYPE: ICD-10-CM

## 2024-02-16 PROCEDURE — 99214 OFFICE O/P EST MOD 30 MIN: CPT | Performed by: NURSE PRACTITIONER

## 2024-02-16 RX ORDER — PHENTERMINE HYDROCHLORIDE 37.5 MG/1
37.5 TABLET ORAL
Qty: 30 TABLET | Refills: 0 | Status: SHIPPED | OUTPATIENT
Start: 2024-02-16 | End: 2024-03-17

## 2024-02-16 NOTE — PROGRESS NOTES
Subjective  Chief Complaint   Patient presents with    Weight Management     Would like to discuss going back on adipex.       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    Hypertension     was hypertension at one time states it has been down for the past 15 years w/o medication     Patient Active Problem List    Diagnosis Date Noted    Lower abdominal pain 10/21/2022    Acute appendicitis 09/11/2023    Acute post-operative pain 09/11/2023     Past Surgical History:   Procedure Laterality Date    ANTERIOR CRUCIATE LIGAMENT REPAIR Left 2003    KNEE CARTILAGE SURGERY Left 2003    LAPAROSCOPIC APPENDECTOMY N/A 9/11/2023    APPENDECTOMY LAPAROSCOPIC performed by Preet Alvarenga MD at [facility] [place]    TONSILLECTOMY Bilateral 2010    WISDOM TOOTH EXTRACTION  1998    3 extracted     No family history on file.  Social History     Socioeconomic History    Marital status:      Spouse name: None    Number of children: None    Years of education: None    Highest education level: None   Tobacco Use    Smoking status: Never    Smokeless tobacco: Never   Substance and Sexual Activity    Alcohol use: Not Currently     Comment: rarely    Drug use: No    Sexual activity: Not Currently     Social Determinants of Health     Financial Resource Strain: Low Risk  (6/23/2023)    Overall Financial Resource Strain (CARDIA)     Difficulty of Paying Living Expenses: Not hard at all   Transportation Needs: Unknown (6/23/2023)    PRAPARE - Transportation     Lack of Transportation (Non-Medical): No   Housing Stability: Unknown (6/23/2023)    Housing Stability Vital Sign     Unstable Housing in the Last Year: No     No current outpatient medications on file prior to visit.     No current facility-administered medications on file prior to visit.     Allergies   Allergen Reactions    Ibuprofen Other (See Comments)     Pt only has 1 kidney     Penicillins Other (See Comments)     Hereditary hx    Sulfa 
encounter.      Orders Placed This Encounter   Medications    phentermine (ADIPEX-P) 37.5 MG tablet     Sig: Take 1 tablet by mouth every morning (before breakfast) for 30 days. Max Daily Amount: 37.5 mg     Dispense:  30 tablet     Refill:  0     BMI 33.78       There are no discontinued medications.    Return in about 1 month (around 3/16/2024) for weight.    Odette Givens, APRN - CNP

## 2024-03-15 ENCOUNTER — OFFICE VISIT (OUTPATIENT)
Dept: FAMILY MEDICINE CLINIC | Age: 39
End: 2024-03-15

## 2024-03-15 VITALS
HEIGHT: 73 IN | OXYGEN SATURATION: 100 % | DIASTOLIC BLOOD PRESSURE: 82 MMHG | BODY MASS INDEX: 31.78 KG/M2 | HEART RATE: 83 BPM | SYSTOLIC BLOOD PRESSURE: 104 MMHG | WEIGHT: 239.8 LBS

## 2024-03-15 DIAGNOSIS — E66.9 CLASS 1 OBESITY WITH SERIOUS COMORBIDITY AND BODY MASS INDEX (BMI) OF 33.0 TO 33.9 IN ADULT, UNSPECIFIED OBESITY TYPE: ICD-10-CM

## 2024-03-15 DIAGNOSIS — R73.03 PREDIABETES: Primary | ICD-10-CM

## 2024-03-15 PROCEDURE — 99213 OFFICE O/P EST LOW 20 MIN: CPT | Performed by: NURSE PRACTITIONER

## 2024-03-15 RX ORDER — PHENTERMINE HYDROCHLORIDE 37.5 MG/1
37.5 TABLET ORAL
Qty: 30 TABLET | Refills: 0 | Status: SHIPPED | OUTPATIENT
Start: 2024-03-15 | End: 2024-04-14

## 2024-03-15 ASSESSMENT — ENCOUNTER SYMPTOMS
CONSTIPATION: 0
SHORTNESS OF BREATH: 0
ABDOMINAL PAIN: 0
CHEST TIGHTNESS: 0
COUGH: 0
DIARRHEA: 0
EYE PAIN: 0
TROUBLE SWALLOWING: 0

## 2024-03-15 NOTE — PROGRESS NOTES
Subjective  Chief Complaint   Patient presents with    Weight Management     1 month check up       HPI    Pt here for follow up on weight, prediabetes.    Lost 17 pounds in the last month. Completed one month on adipex.    Only eating dinner, having protein and vegetables. Last night was smoked sausage and vegetables. Not eating breakfast or lunch, doing intermittent fasting.    Feeling well, better than previous. More energy.    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    Hypertension     was hypertension at one time states it has been down for the past 15 years w/o medication     Patient Active Problem List    Diagnosis Date Noted    Lower abdominal pain 10/21/2022    Acute appendicitis 09/11/2023    Acute post-operative pain 09/11/2023     Past Surgical History:   Procedure Laterality Date    ANTERIOR CRUCIATE LIGAMENT REPAIR Left 2003    KNEE CARTILAGE SURGERY Left 2003    LAPAROSCOPIC APPENDECTOMY N/A 9/11/2023    APPENDECTOMY LAPAROSCOPIC performed by Preet Alvarenga MD at [facility] [place]    TONSILLECTOMY Bilateral 2010    WISDOM TOOTH EXTRACTION  1998    3 extracted     No family history on file.  Social History     Socioeconomic History    Marital status:      Spouse name: None    Number of children: None    Years of education: None    Highest education level: None   Tobacco Use    Smoking status: Never    Smokeless tobacco: Never   Substance and Sexual Activity    Alcohol use: Not Currently     Comment: rarely    Drug use: No    Sexual activity: Not Currently     Social Determinants of Health     Financial Resource Strain: Low Risk  (6/23/2023)    Overall Financial Resource Strain (CARDIA)     Difficulty of Paying Living Expenses: Not hard at all   Transportation Needs: Unknown (6/23/2023)    PRAPARE - Transportation     Lack of Transportation (Non-Medical): No   Housing Stability: Unknown (6/23/2023)    Housing Stability Vital Sign     Unstable Housing in the Last

## 2024-04-19 ENCOUNTER — OFFICE VISIT (OUTPATIENT)
Dept: FAMILY MEDICINE CLINIC | Age: 39
End: 2024-04-19
Payer: COMMERCIAL

## 2024-04-19 VITALS
SYSTOLIC BLOOD PRESSURE: 118 MMHG | BODY MASS INDEX: 30.75 KG/M2 | OXYGEN SATURATION: 98 % | HEART RATE: 86 BPM | DIASTOLIC BLOOD PRESSURE: 84 MMHG | WEIGHT: 232 LBS | HEIGHT: 73 IN

## 2024-04-19 DIAGNOSIS — R73.03 PREDIABETES: ICD-10-CM

## 2024-04-19 DIAGNOSIS — E66.9 CLASS 1 OBESITY WITH SERIOUS COMORBIDITY AND BODY MASS INDEX (BMI) OF 33.0 TO 33.9 IN ADULT, UNSPECIFIED OBESITY TYPE: ICD-10-CM

## 2024-04-19 PROCEDURE — G8427 DOCREV CUR MEDS BY ELIG CLIN: HCPCS | Performed by: NURSE PRACTITIONER

## 2024-04-19 PROCEDURE — 1036F TOBACCO NON-USER: CPT | Performed by: NURSE PRACTITIONER

## 2024-04-19 PROCEDURE — G8417 CALC BMI ABV UP PARAM F/U: HCPCS | Performed by: NURSE PRACTITIONER

## 2024-04-19 PROCEDURE — 99213 OFFICE O/P EST LOW 20 MIN: CPT | Performed by: NURSE PRACTITIONER

## 2024-04-19 RX ORDER — PHENTERMINE HYDROCHLORIDE 37.5 MG/1
37.5 TABLET ORAL
Qty: 30 TABLET | Refills: 0 | Status: SHIPPED | OUTPATIENT
Start: 2024-04-19 | End: 2024-05-19

## 2024-04-19 ASSESSMENT — ENCOUNTER SYMPTOMS
DIARRHEA: 0
CONSTIPATION: 0
TROUBLE SWALLOWING: 0
SHORTNESS OF BREATH: 0
CHEST TIGHTNESS: 0
EYE PAIN: 0

## 2024-04-19 NOTE — PROGRESS NOTES
Subjective  Chief Complaint   Patient presents with    Weight Management     1 month check up       HPI    Pt here for follow up on weight, prediabetes.    Lost 7 pounds in the last month. Completed 2nd month on adipex.    Only eating dinner, having protein and vegetables.  Not eating breakfast or lunch, doing intermittent fasting.    Tracking calories, getting between 9043-7252 calories per day.    Feeling well, better than previous. More energy.    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    Hypertension     was hypertension at one time states it has been down for the past 15 years w/o medication     Patient Active Problem List    Diagnosis Date Noted    Lower abdominal pain 10/21/2022    Acute appendicitis 09/11/2023    Acute post-operative pain 09/11/2023     Past Surgical History:   Procedure Laterality Date    ANTERIOR CRUCIATE LIGAMENT REPAIR Left 2003    KNEE CARTILAGE SURGERY Left 2003    LAPAROSCOPIC APPENDECTOMY N/A 9/11/2023    APPENDECTOMY LAPAROSCOPIC performed by Preet Alvarenga MD at [facility] [place]    TONSILLECTOMY Bilateral 2010    WISDOM TOOTH EXTRACTION  1998    3 extracted     No family history on file.  Social History     Socioeconomic History    Marital status:      Spouse name: None    Number of children: None    Years of education: None    Highest education level: None   Tobacco Use    Smoking status: Never    Smokeless tobacco: Never   Substance and Sexual Activity    Alcohol use: Not Currently     Comment: rarely    Drug use: No    Sexual activity: Not Currently     Social Determinants of Health     Financial Resource Strain: Low Risk  (6/23/2023)    Overall Financial Resource Strain (CARDIA)     Difficulty of Paying Living Expenses: Not hard at all   Transportation Needs: Unknown (6/23/2023)    PRAPARE - Transportation     Lack of Transportation (Non-Medical): No   Housing Stability: Unknown (6/23/2023)    Housing Stability Vital Sign     Unstable

## 2024-05-17 ENCOUNTER — OFFICE VISIT (OUTPATIENT)
Dept: FAMILY MEDICINE CLINIC | Age: 39
End: 2024-05-17

## 2024-05-17 VITALS
WEIGHT: 227.6 LBS | DIASTOLIC BLOOD PRESSURE: 84 MMHG | HEART RATE: 83 BPM | BODY MASS INDEX: 30.17 KG/M2 | TEMPERATURE: 97.6 F | HEIGHT: 73 IN | SYSTOLIC BLOOD PRESSURE: 122 MMHG | OXYGEN SATURATION: 98 %

## 2024-05-17 DIAGNOSIS — E78.1 HYPERTRIGLYCERIDEMIA: Primary | ICD-10-CM

## 2024-05-17 DIAGNOSIS — Z00.00 ENCOUNTER FOR PREVENTIVE HEALTH EXAMINATION: ICD-10-CM

## 2024-05-17 DIAGNOSIS — E66.9 CLASS 1 OBESITY WITH SERIOUS COMORBIDITY AND BODY MASS INDEX (BMI) OF 33.0 TO 33.9 IN ADULT, UNSPECIFIED OBESITY TYPE: ICD-10-CM

## 2024-05-17 DIAGNOSIS — R73.03 PREDIABETES: ICD-10-CM

## 2024-05-17 RX ORDER — PHENTERMINE HYDROCHLORIDE 37.5 MG/1
37.5 TABLET ORAL
Qty: 30 TABLET | Refills: 0 | Status: SHIPPED | OUTPATIENT
Start: 2024-05-17 | End: 2024-06-16

## 2024-05-17 ASSESSMENT — ENCOUNTER SYMPTOMS
EYE PAIN: 0
CHEST TIGHTNESS: 0
CONSTIPATION: 0
TROUBLE SWALLOWING: 0
SHORTNESS OF BREATH: 0
DIARRHEA: 0

## 2024-05-17 NOTE — PROGRESS NOTES
Subjective  Chief Complaint   Patient presents with    1 Month Follow-Up    Weight Management     No concerns       HPI    Pt here for follow up on weight, prediabetes.    Lost 5 pounds in the last month. Completed 3rd month on adipex. Has lost 29 lbs total over the course of 3 months on the adipex.    Has started light lifting, strength training this past month. Can tell he is gaining muscle.     Only eating dinner, having protein and vegetables.  Not eating breakfast or lunch, doing intermittent fasting.    Tracking calories, getting between 3723-3285 calories per day.    Feeling well, better than previous. More energy.    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    Hypertension     was hypertension at one time states it has been down for the past 15 years w/o medication     Patient Active Problem List    Diagnosis Date Noted    Lower abdominal pain 10/21/2022    Acute appendicitis 09/11/2023    Acute post-operative pain 09/11/2023     Past Surgical History:   Procedure Laterality Date    ANTERIOR CRUCIATE LIGAMENT REPAIR Left 2003    KNEE CARTILAGE SURGERY Left 2003    LAPAROSCOPIC APPENDECTOMY N/A 9/11/2023    APPENDECTOMY LAPAROSCOPIC performed by Preet Alvarenga MD at [facility] [place]    TONSILLECTOMY Bilateral 2010    WISDOM TOOTH EXTRACTION  1998    3 extracted     No family history on file.  Social History     Socioeconomic History    Marital status:      Spouse name: None    Number of children: None    Years of education: None    Highest education level: None   Tobacco Use    Smoking status: Never     Passive exposure: Never    Smokeless tobacco: Never   Substance and Sexual Activity    Alcohol use: Not Currently     Comment: rarely    Drug use: No    Sexual activity: Not Currently     Social Determinants of Health     Financial Resource Strain: Low Risk  (6/23/2023)    Overall Financial Resource Strain (CARDIA)     Difficulty of Paying Living Expenses: Not hard at all

## 2024-06-14 DIAGNOSIS — E78.1 HYPERTRIGLYCERIDEMIA: ICD-10-CM

## 2024-06-14 DIAGNOSIS — R73.03 PREDIABETES: ICD-10-CM

## 2024-06-14 DIAGNOSIS — Z00.00 ENCOUNTER FOR PREVENTIVE HEALTH EXAMINATION: ICD-10-CM

## 2024-06-14 LAB
ALBUMIN SERPL-MCNC: 4.5 G/DL (ref 3.5–4.6)
ALP SERPL-CCNC: 98 U/L (ref 35–104)
ALT SERPL-CCNC: 18 U/L (ref 0–41)
ANION GAP SERPL CALCULATED.3IONS-SCNC: 12 MEQ/L (ref 9–15)
AST SERPL-CCNC: 16 U/L (ref 0–40)
BILIRUB SERPL-MCNC: <0.2 MG/DL (ref 0.2–0.7)
BUN SERPL-MCNC: 17 MG/DL (ref 6–20)
CALCIUM SERPL-MCNC: 9.3 MG/DL (ref 8.5–9.9)
CHLORIDE SERPL-SCNC: 102 MEQ/L (ref 95–107)
CHOLEST SERPL-MCNC: 240 MG/DL (ref 0–199)
CO2 SERPL-SCNC: 25 MEQ/L (ref 20–31)
CREAT SERPL-MCNC: 0.89 MG/DL (ref 0.7–1.2)
ERYTHROCYTE [DISTWIDTH] IN BLOOD BY AUTOMATED COUNT: 12.4 % (ref 11.5–14.5)
GLOBULIN SER CALC-MCNC: 2.7 G/DL (ref 2.3–3.5)
GLUCOSE SERPL-MCNC: 100 MG/DL (ref 70–99)
HCT VFR BLD AUTO: 45.6 % (ref 42–52)
HDLC SERPL-MCNC: 43 MG/DL (ref 40–59)
HGB BLD-MCNC: 15.3 G/DL (ref 14–18)
LDLC SERPL CALC-MCNC: 169 MG/DL (ref 0–129)
MCH RBC QN AUTO: 28.8 PG (ref 27–31.3)
MCHC RBC AUTO-ENTMCNC: 33.6 % (ref 33–37)
MCV RBC AUTO: 85.9 FL (ref 79–92.2)
PLATELET # BLD AUTO: 247 K/UL (ref 130–400)
POTASSIUM SERPL-SCNC: 4.7 MEQ/L (ref 3.4–4.9)
PROT SERPL-MCNC: 7.2 G/DL (ref 6.3–8)
RBC # BLD AUTO: 5.31 M/UL (ref 4.7–6.1)
SODIUM SERPL-SCNC: 139 MEQ/L (ref 135–144)
TRIGL SERPL-MCNC: 138 MG/DL (ref 0–150)
WBC # BLD AUTO: 7.1 K/UL (ref 4.8–10.8)

## 2024-06-15 LAB
ESTIMATED AVERAGE GLUCOSE: 117 MG/DL
HBA1C MFR BLD: 5.7 % (ref 4–6)

## 2024-06-21 ENCOUNTER — OFFICE VISIT (OUTPATIENT)
Dept: FAMILY MEDICINE CLINIC | Age: 39
End: 2024-06-21
Payer: COMMERCIAL

## 2024-06-21 VITALS
OXYGEN SATURATION: 99 % | DIASTOLIC BLOOD PRESSURE: 70 MMHG | HEIGHT: 73 IN | WEIGHT: 222 LBS | BODY MASS INDEX: 29.42 KG/M2 | SYSTOLIC BLOOD PRESSURE: 112 MMHG | HEART RATE: 93 BPM

## 2024-06-21 DIAGNOSIS — E78.1 HYPERTRIGLYCERIDEMIA: ICD-10-CM

## 2024-06-21 DIAGNOSIS — R73.03 PREDIABETES: Primary | ICD-10-CM

## 2024-06-21 DIAGNOSIS — E66.9 CLASS 1 OBESITY WITH SERIOUS COMORBIDITY AND BODY MASS INDEX (BMI) OF 33.0 TO 33.9 IN ADULT, UNSPECIFIED OBESITY TYPE: ICD-10-CM

## 2024-06-21 PROCEDURE — 1036F TOBACCO NON-USER: CPT | Performed by: NURSE PRACTITIONER

## 2024-06-21 PROCEDURE — 99214 OFFICE O/P EST MOD 30 MIN: CPT | Performed by: NURSE PRACTITIONER

## 2024-06-21 PROCEDURE — G8417 CALC BMI ABV UP PARAM F/U: HCPCS | Performed by: NURSE PRACTITIONER

## 2024-06-21 PROCEDURE — G8427 DOCREV CUR MEDS BY ELIG CLIN: HCPCS | Performed by: NURSE PRACTITIONER

## 2024-06-21 RX ORDER — PHENTERMINE HYDROCHLORIDE 37.5 MG/1
37.5 TABLET ORAL
Qty: 30 TABLET | Refills: 0 | Status: SHIPPED | OUTPATIENT
Start: 2024-06-21 | End: 2024-07-21

## 2024-06-21 ASSESSMENT — ENCOUNTER SYMPTOMS
CONSTIPATION: 0
EYE PAIN: 0
SHORTNESS OF BREATH: 0
CHEST TIGHTNESS: 0
TROUBLE SWALLOWING: 0
DIARRHEA: 0

## 2024-06-21 NOTE — PROGRESS NOTES
Subjective  Chief Complaint   Patient presents with    Weight Management     Check up       HPI    Pt here for follow up on weight, prediabetes.    Lost 5 pounds in the last month.     Has started light lifting, strength training this past month. Can tell he is gaining muscle.     Only eating dinner, having protein and vegetables.  Not eating breakfast or lunch, doing intermittent fasting.    Tracking calories, getting around 2000 calories daily.   Feeling well, better than previous. More energy.    His goal weight that he would like to get to is under 200 lbs.    Plans to stop adipex in 1 month. Would like to be off of it for the fall.     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    Hypertension     was hypertension at one time states it has been down for the past 15 years w/o medication     Patient Active Problem List    Diagnosis Date Noted    Lower abdominal pain 10/21/2022    Acute appendicitis 09/11/2023    Acute post-operative pain 09/11/2023     Past Surgical History:   Procedure Laterality Date    ANTERIOR CRUCIATE LIGAMENT REPAIR Left 2003    KNEE CARTILAGE SURGERY Left 2003    LAPAROSCOPIC APPENDECTOMY N/A 9/11/2023    APPENDECTOMY LAPAROSCOPIC performed by Preet Alvarenga [place] at [facility] [place]    TONSILLECTOMY Bilateral 2010    WISDOM TOOTH EXTRACTION  1998    3 extracted     No family history on file.  Social History     Socioeconomic History    Marital status:      Spouse name: None    Number of children: None    Years of education: None    Highest education level: None   Tobacco Use    Smoking status: Never     Passive exposure: Never    Smokeless tobacco: Never   Substance and Sexual Activity    Alcohol use: Not Currently     Comment: rarely    Drug use: No    Sexual activity: Not Currently     Social Determinants of Health     Financial Resource Strain: Low Risk  (6/23/2023)    Overall Financial Resource Strain (CARDIA)     Difficulty of Paying Living Expenses:

## 2024-12-06 ENCOUNTER — OFFICE VISIT (OUTPATIENT)
Dept: FAMILY MEDICINE CLINIC | Age: 39
End: 2024-12-06
Payer: COMMERCIAL

## 2024-12-06 VITALS
SYSTOLIC BLOOD PRESSURE: 134 MMHG | OXYGEN SATURATION: 99 % | DIASTOLIC BLOOD PRESSURE: 78 MMHG | BODY MASS INDEX: 32.42 KG/M2 | HEART RATE: 87 BPM | WEIGHT: 244.6 LBS | HEIGHT: 73 IN

## 2024-12-06 DIAGNOSIS — R73.03 PREDIABETES: Primary | ICD-10-CM

## 2024-12-06 DIAGNOSIS — E78.1 HYPERTRIGLYCERIDEMIA: ICD-10-CM

## 2024-12-06 DIAGNOSIS — E66.811 CLASS 1 OBESITY WITH SERIOUS COMORBIDITY AND BODY MASS INDEX (BMI) OF 32.0 TO 32.9 IN ADULT, UNSPECIFIED OBESITY TYPE: ICD-10-CM

## 2024-12-06 PROCEDURE — G8427 DOCREV CUR MEDS BY ELIG CLIN: HCPCS | Performed by: NURSE PRACTITIONER

## 2024-12-06 PROCEDURE — G8417 CALC BMI ABV UP PARAM F/U: HCPCS | Performed by: NURSE PRACTITIONER

## 2024-12-06 PROCEDURE — 99213 OFFICE O/P EST LOW 20 MIN: CPT | Performed by: NURSE PRACTITIONER

## 2024-12-06 PROCEDURE — 1036F TOBACCO NON-USER: CPT | Performed by: NURSE PRACTITIONER

## 2024-12-06 PROCEDURE — G8484 FLU IMMUNIZE NO ADMIN: HCPCS | Performed by: NURSE PRACTITIONER

## 2024-12-06 RX ORDER — PHENTERMINE HYDROCHLORIDE 37.5 MG/1
37.5 TABLET ORAL
Qty: 30 TABLET | Refills: 0 | Status: SHIPPED | OUTPATIENT
Start: 2024-12-06 | End: 2025-01-05

## 2024-12-06 SDOH — ECONOMIC STABILITY: FOOD INSECURITY: WITHIN THE PAST 12 MONTHS, YOU WORRIED THAT YOUR FOOD WOULD RUN OUT BEFORE YOU GOT MONEY TO BUY MORE.: NEVER TRUE

## 2024-12-06 SDOH — ECONOMIC STABILITY: FOOD INSECURITY: WITHIN THE PAST 12 MONTHS, THE FOOD YOU BOUGHT JUST DIDN'T LAST AND YOU DIDN'T HAVE MONEY TO GET MORE.: NEVER TRUE

## 2024-12-06 SDOH — ECONOMIC STABILITY: INCOME INSECURITY: HOW HARD IS IT FOR YOU TO PAY FOR THE VERY BASICS LIKE FOOD, HOUSING, MEDICAL CARE, AND HEATING?: NOT HARD AT ALL

## 2024-12-06 ASSESSMENT — ENCOUNTER SYMPTOMS
SHORTNESS OF BREATH: 0
ABDOMINAL PAIN: 0
DIARRHEA: 0
CHEST TIGHTNESS: 0
EYE PAIN: 0
COLOR CHANGE: 0
COUGH: 0
TROUBLE SWALLOWING: 0
CONSTIPATION: 0

## 2024-12-06 NOTE — PROGRESS NOTES
Subjective  Chief Complaint   Patient presents with    prediabetes     6 mos follow up, would like to discuss going back on adipex.       HPI    Pt here for check up.    Doing well overall.    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 Much more physically active than he used to be.     Is interested in restarting adipex now.     Labs completed in June were all okay.      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    Hypertension     was hypertension at one time states it has been down for the past 15 years w/o medication     Patient Active Problem List    Diagnosis Date Noted    Lower abdominal pain 10/21/2022    Acute appendicitis 09/11/2023    Acute post-operative pain 09/11/2023     Past Surgical History:   Procedure Laterality Date    ANTERIOR CRUCIATE LIGAMENT REPAIR Left 2003    KNEE CARTILAGE SURGERY Left 2003    LAPAROSCOPIC APPENDECTOMY N/A 9/11/2023    APPENDECTOMY LAPAROSCOPIC performed by Preet Alvarenga MD at [facility] [place]    TONSILLECTOMY Bilateral 2010    WISDOM TOOTH EXTRACTION  1998    3 extracted     No family history on file.  Social History     Socioeconomic History    Marital status:      Spouse name: None    Number of children: None    Years of education: None    Highest education level: None   Tobacco Use    Smoking status: Never     Passive exposure: Never    Smokeless tobacco: Never   Substance and Sexual Activity    Alcohol use: Not Currently     Comment: rarely    Drug use: No    Sexual activity: Not Currently     Social Determinants of Health     Financial Resource Strain: Low Risk  (12/6/2024)    Overall Financial Resource Strain (CARDIA)     Difficulty of Paying Living Expenses: Not hard at all   Food Insecurity: No Food Insecurity (12/6/2024)    Hunger Vital Sign     Worried About Running Out of Food in the Last Year: Never true     Ran Out of Food in the Last Year: Never true   Transportation Needs: Unknown (12/6/2024)

## 2025-01-17 ENCOUNTER — OFFICE VISIT (OUTPATIENT)
Dept: FAMILY MEDICINE CLINIC | Age: 40
End: 2025-01-17
Payer: COMMERCIAL

## 2025-01-17 VITALS
WEIGHT: 237 LBS | OXYGEN SATURATION: 100 % | HEART RATE: 83 BPM | SYSTOLIC BLOOD PRESSURE: 124 MMHG | HEIGHT: 73 IN | BODY MASS INDEX: 31.41 KG/M2 | DIASTOLIC BLOOD PRESSURE: 86 MMHG

## 2025-01-17 DIAGNOSIS — E66.811 CLASS 1 OBESITY WITH SERIOUS COMORBIDITY AND BODY MASS INDEX (BMI) OF 31.0 TO 31.9 IN ADULT, UNSPECIFIED OBESITY TYPE: ICD-10-CM

## 2025-01-17 DIAGNOSIS — R73.03 PREDIABETES: Primary | ICD-10-CM

## 2025-01-17 PROCEDURE — 99213 OFFICE O/P EST LOW 20 MIN: CPT | Performed by: NURSE PRACTITIONER

## 2025-01-17 PROCEDURE — G8417 CALC BMI ABV UP PARAM F/U: HCPCS | Performed by: NURSE PRACTITIONER

## 2025-01-17 PROCEDURE — 1036F TOBACCO NON-USER: CPT | Performed by: NURSE PRACTITIONER

## 2025-01-17 PROCEDURE — G8427 DOCREV CUR MEDS BY ELIG CLIN: HCPCS | Performed by: NURSE PRACTITIONER

## 2025-01-17 RX ORDER — PHENTERMINE HYDROCHLORIDE 37.5 MG/1
37.5 TABLET ORAL
COMMUNITY
End: 2025-01-17 | Stop reason: SDUPTHER

## 2025-01-17 RX ORDER — PHENTERMINE HYDROCHLORIDE 37.5 MG/1
37.5 TABLET ORAL
Qty: 30 TABLET | Refills: 0 | Status: SHIPPED | OUTPATIENT
Start: 2025-01-17 | End: 2025-02-16

## 2025-01-17 SDOH — ECONOMIC STABILITY: FOOD INSECURITY: WITHIN THE PAST 12 MONTHS, YOU WORRIED THAT YOUR FOOD WOULD RUN OUT BEFORE YOU GOT MONEY TO BUY MORE.: NEVER TRUE

## 2025-01-17 SDOH — ECONOMIC STABILITY: FOOD INSECURITY: WITHIN THE PAST 12 MONTHS, THE FOOD YOU BOUGHT JUST DIDN'T LAST AND YOU DIDN'T HAVE MONEY TO GET MORE.: NEVER TRUE

## 2025-01-17 ASSESSMENT — PATIENT HEALTH QUESTIONNAIRE - PHQ9
SUM OF ALL RESPONSES TO PHQ9 QUESTIONS 1 & 2: 0
SUM OF ALL RESPONSES TO PHQ QUESTIONS 1-9: 0
2. FEELING DOWN, DEPRESSED OR HOPELESS: NOT AT ALL
1. LITTLE INTEREST OR PLEASURE IN DOING THINGS: NOT AT ALL

## 2025-01-17 ASSESSMENT — ENCOUNTER SYMPTOMS
CONSTIPATION: 0
DIARRHEA: 0
COUGH: 0
SHORTNESS OF BREATH: 0
TROUBLE SWALLOWING: 0
EYE PAIN: 0
CHEST TIGHTNESS: 0
ABDOMINAL PAIN: 0

## 2025-01-17 NOTE — PROGRESS NOTES
Sitting   Cuff Size: Large Adult   Pulse: 83   SpO2: 100%   Weight: 107.5 kg (237 lb)   Height: 1.854 m (6' 1\")     Physical Exam  Constitutional:       General: He is not in acute distress.     Appearance: Normal appearance. He is obese. He is not ill-appearing, toxic-appearing or diaphoretic.   HENT:      Head: Normocephalic and atraumatic.      Right Ear: External ear normal.      Left Ear: External ear normal.      Nose: Nose normal. No congestion or rhinorrhea.   Eyes:      Extraocular Movements: Extraocular movements intact.      Conjunctiva/sclera: Conjunctivae normal.      Pupils: Pupils are equal, round, and reactive to light.   Cardiovascular:      Rate and Rhythm: Normal rate and regular rhythm.      Pulses: Normal pulses.      Heart sounds: Normal heart sounds. No murmur heard.  Pulmonary:      Effort: Pulmonary effort is normal. No respiratory distress.      Breath sounds: Normal breath sounds. No stridor. No wheezing, rhonchi or rales.   Chest:      Chest wall: No tenderness.   Musculoskeletal:         General: Normal range of motion.      Cervical back: Normal range of motion and neck supple. No tenderness.      Right lower leg: No edema.      Left lower leg: No edema.   Lymphadenopathy:      Cervical: No cervical adenopathy.   Skin:     General: Skin is warm.      Capillary Refill: Capillary refill takes less than 2 seconds.      Coloration: Skin is not jaundiced.      Findings: No erythema or lesion.   Neurological:      General: No focal deficit present.      Mental Status: He is alert and oriented to person, place, and time. Mental status is at baseline.      Cranial Nerves: No cranial nerve deficit.      Coordination: Coordination normal.      Gait: Gait normal.   Psychiatric:         Mood and Affect: Mood normal.         Behavior: Behavior normal.         Thought Content: Thought content normal.         Judgment: Judgment normal.       Physical Exam  Lungs were auscultated.  Heart was

## 2025-02-21 ENCOUNTER — OFFICE VISIT (OUTPATIENT)
Dept: FAMILY MEDICINE CLINIC | Age: 40
End: 2025-02-21
Payer: COMMERCIAL

## 2025-02-21 VITALS
WEIGHT: 231 LBS | BODY MASS INDEX: 30.62 KG/M2 | HEART RATE: 77 BPM | DIASTOLIC BLOOD PRESSURE: 78 MMHG | HEIGHT: 73 IN | OXYGEN SATURATION: 100 % | SYSTOLIC BLOOD PRESSURE: 122 MMHG

## 2025-02-21 DIAGNOSIS — R73.03 PREDIABETES: Primary | ICD-10-CM

## 2025-02-21 DIAGNOSIS — E66.811 CLASS 1 OBESITY WITH SERIOUS COMORBIDITY AND BODY MASS INDEX (BMI) OF 31.0 TO 31.9 IN ADULT, UNSPECIFIED OBESITY TYPE: ICD-10-CM

## 2025-02-21 PROCEDURE — 1036F TOBACCO NON-USER: CPT | Performed by: NURSE PRACTITIONER

## 2025-02-21 PROCEDURE — 99213 OFFICE O/P EST LOW 20 MIN: CPT | Performed by: NURSE PRACTITIONER

## 2025-02-21 PROCEDURE — G8427 DOCREV CUR MEDS BY ELIG CLIN: HCPCS | Performed by: NURSE PRACTITIONER

## 2025-02-21 PROCEDURE — G8417 CALC BMI ABV UP PARAM F/U: HCPCS | Performed by: NURSE PRACTITIONER

## 2025-02-21 RX ORDER — PHENTERMINE HYDROCHLORIDE 37.5 MG/1
37.5 TABLET ORAL
Qty: 30 TABLET | Refills: 0 | Status: SHIPPED | OUTPATIENT
Start: 2025-02-21 | End: 2025-03-23

## 2025-02-21 ASSESSMENT — ENCOUNTER SYMPTOMS
SHORTNESS OF BREATH: 0
DIARRHEA: 0
TROUBLE SWALLOWING: 0
EYE PAIN: 0
CONSTIPATION: 0
ABDOMINAL PAIN: 0
COUGH: 0
CHEST TIGHTNESS: 0

## 2025-02-21 NOTE — PROGRESS NOTES
symptoms worsen in any way they will proceed to the nearest emergency room.       No orders of the defined types were placed in this encounter.      Orders Placed This Encounter   Medications    phentermine (ADIPEX-P) 37.5 MG tablet     Sig: Take 1 tablet by mouth every morning (before breakfast) for 30 days. Max Daily Amount: 37.5 mg     Dispense:  30 tablet     Refill:  0       Medications Discontinued During This Encounter   Medication Reason    phentermine (ADIPEX-P) 37.5 MG tablet REORDER       Return in about 6 weeks (around 4/4/2025) for prediabetes, weight.    Odette Givens, APRN - CNP

## 2025-03-21 DIAGNOSIS — E66.811 CLASS 1 OBESITY WITH SERIOUS COMORBIDITY AND BODY MASS INDEX (BMI) OF 31.0 TO 31.9 IN ADULT, UNSPECIFIED OBESITY TYPE: ICD-10-CM

## 2025-03-21 DIAGNOSIS — R73.03 PREDIABETES: ICD-10-CM

## 2025-03-21 NOTE — TELEPHONE ENCOUNTER
Comments:      Last Office Visit (last PCP visit):   2/21/2025     Next Visit Date:    Future Appointments   Date Time Provider Department Center   4/4/2025  8:15 AM Odette Givens, APRN - CNP [facility] DEP        **If hasn't been seen in over a year OR hasn't followed up according to last diabetes/ADHD visit, make appointment for patient before sending refill to provider.     Rx requested:    Requested Prescriptions     Pending Prescriptions Disp Refills    phentermine (ADIPEX-P) 37.5 MG tablet 30 tablet 0     Sig: Take 1 tablet by mouth every morning (before breakfast) for 30 days. Max Daily Amount: 37.5 mg

## 2025-03-22 RX ORDER — PHENTERMINE HYDROCHLORIDE 37.5 MG/1
37.5 TABLET ORAL
Qty: 30 TABLET | Refills: 0 | Status: SHIPPED | OUTPATIENT
Start: 2025-03-22 | End: 2025-04-21

## 2025-04-04 ENCOUNTER — OFFICE VISIT (OUTPATIENT)
Dept: FAMILY MEDICINE CLINIC | Age: 40
End: 2025-04-04
Payer: COMMERCIAL

## 2025-04-04 VITALS
BODY MASS INDEX: 30.35 KG/M2 | WEIGHT: 229 LBS | SYSTOLIC BLOOD PRESSURE: 100 MMHG | DIASTOLIC BLOOD PRESSURE: 80 MMHG | HEART RATE: 90 BPM | HEIGHT: 73 IN | OXYGEN SATURATION: 100 %

## 2025-04-04 DIAGNOSIS — E66.811 CLASS 1 OBESITY WITHOUT SERIOUS COMORBIDITY WITH BODY MASS INDEX (BMI) OF 30.0 TO 30.9 IN ADULT, UNSPECIFIED OBESITY TYPE: ICD-10-CM

## 2025-04-04 DIAGNOSIS — R73.03 PREDIABETES: Primary | ICD-10-CM

## 2025-04-04 PROCEDURE — 99213 OFFICE O/P EST LOW 20 MIN: CPT | Performed by: NURSE PRACTITIONER

## 2025-04-04 PROCEDURE — 1036F TOBACCO NON-USER: CPT | Performed by: NURSE PRACTITIONER

## 2025-04-04 PROCEDURE — G8417 CALC BMI ABV UP PARAM F/U: HCPCS | Performed by: NURSE PRACTITIONER

## 2025-04-04 PROCEDURE — G8427 DOCREV CUR MEDS BY ELIG CLIN: HCPCS | Performed by: NURSE PRACTITIONER

## 2025-04-04 ASSESSMENT — ENCOUNTER SYMPTOMS
ABDOMINAL PAIN: 0
COUGH: 0
COLOR CHANGE: 0
SHORTNESS OF BREATH: 0
CHEST TIGHTNESS: 0
CONSTIPATION: 0
DIARRHEA: 0
TROUBLE SWALLOWING: 0
EYE PAIN: 0

## 2025-04-04 NOTE — PROGRESS NOTES
Subjective  Chief Complaint   Patient presents with    prediabetes     Check up, no concerns       HPI    Here for weight check up, lost 15 lbs total since starting adipex in December. Staying active, lots of hiking, no consistent weight training or intentional exercise. Diet consists of only eating dinner most days and drinking water, about 2200 calories daily. No complaints with the medications. Would like to continue medication.  Feeling discouraged that he did not lose more weight than he did.    Due for labs in June.      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    Hypertension     was hypertension at one time states it has been down for the past 15 years w/o medication     Patient Active Problem List    Diagnosis Date Noted    Lower abdominal pain 10/21/2022    Acute appendicitis 09/11/2023    Acute post-operative pain 09/11/2023     Past Surgical History:   Procedure Laterality Date    ANTERIOR CRUCIATE LIGAMENT REPAIR Left 2003    KNEE CARTILAGE SURGERY Left 2003    LAPAROSCOPIC APPENDECTOMY N/A 9/11/2023    APPENDECTOMY LAPAROSCOPIC performed by Preet Alvarenga MD at [facility] [place]    TONSILLECTOMY Bilateral 2010    WISDOM TOOTH EXTRACTION  1998    3 extracted     No family history on file.  Social History     Socioeconomic History    Marital status:      Spouse name: None    Number of children: None    Years of education: None    Highest education level: None   Tobacco Use    Smoking status: Never     Passive exposure: Never    Smokeless tobacco: Never   Substance and Sexual Activity    Alcohol use: Not Currently     Comment: rarely    Drug use: No    Sexual activity: Not Currently     Social Drivers of Health     Financial Resource Strain: Low Risk  (12/6/2024)    Overall Financial Resource Strain (CARDIA)     Difficulty of Paying Living Expenses: Not hard at all   Food Insecurity: No Food Insecurity (1/17/2025)    Hunger Vital Sign     Worried About Running Out of

## 2025-04-21 DIAGNOSIS — E66.811 CLASS 1 OBESITY WITH SERIOUS COMORBIDITY AND BODY MASS INDEX (BMI) OF 31.0 TO 31.9 IN ADULT, UNSPECIFIED OBESITY TYPE: ICD-10-CM

## 2025-04-21 DIAGNOSIS — R73.03 PREDIABETES: ICD-10-CM

## 2025-04-21 NOTE — TELEPHONE ENCOUNTER
Comments: Refill     Last Office Visit (last PCP visit):   4/4/2025     Next Visit Date:    Future Appointments   Date Time Provider Department Center   5/16/2025  8:00 AM Odette Givens, NUSRAT - CNP [facility] DEP        **If hasn't been seen in over a year OR hasn't followed up according to last diabetes/ADHD visit, make appointment for patient before sending refill to provider.     Rx requested:    Requested Prescriptions     Pending Prescriptions Disp Refills    phentermine (ADIPEX-P) 37.5 MG tablet 30 tablet 0     Sig: Take 1 tablet by mouth every morning (before breakfast) for 30 days. Max Daily Amount: 37.5 mg

## 2025-04-23 RX ORDER — PHENTERMINE HYDROCHLORIDE 37.5 MG/1
37.5 TABLET ORAL
Qty: 30 TABLET | Refills: 0 | Status: SHIPPED | OUTPATIENT
Start: 2025-04-23 | End: 2025-05-23

## 2025-05-16 ENCOUNTER — OFFICE VISIT (OUTPATIENT)
Dept: FAMILY MEDICINE CLINIC | Age: 40
End: 2025-05-16
Payer: COMMERCIAL

## 2025-05-16 VITALS
HEART RATE: 96 BPM | HEIGHT: 73 IN | WEIGHT: 231 LBS | BODY MASS INDEX: 30.62 KG/M2 | OXYGEN SATURATION: 100 % | SYSTOLIC BLOOD PRESSURE: 136 MMHG | DIASTOLIC BLOOD PRESSURE: 88 MMHG

## 2025-05-16 DIAGNOSIS — E66.811 CLASS 1 OBESITY WITHOUT SERIOUS COMORBIDITY WITH BODY MASS INDEX (BMI) OF 30.0 TO 30.9 IN ADULT, UNSPECIFIED OBESITY TYPE: ICD-10-CM

## 2025-05-16 DIAGNOSIS — E78.1 HYPERTRIGLYCERIDEMIA: ICD-10-CM

## 2025-05-16 DIAGNOSIS — R73.03 PREDIABETES: Primary | ICD-10-CM

## 2025-05-16 DIAGNOSIS — Z00.00 ENCOUNTER FOR PREVENTIVE HEALTH EXAMINATION: ICD-10-CM

## 2025-05-16 PROCEDURE — G8417 CALC BMI ABV UP PARAM F/U: HCPCS | Performed by: NURSE PRACTITIONER

## 2025-05-16 PROCEDURE — 1036F TOBACCO NON-USER: CPT | Performed by: NURSE PRACTITIONER

## 2025-05-16 PROCEDURE — G8427 DOCREV CUR MEDS BY ELIG CLIN: HCPCS | Performed by: NURSE PRACTITIONER

## 2025-05-16 PROCEDURE — 99213 OFFICE O/P EST LOW 20 MIN: CPT | Performed by: NURSE PRACTITIONER

## 2025-05-16 ASSESSMENT — ENCOUNTER SYMPTOMS
ABDOMINAL PAIN: 0
CHEST TIGHTNESS: 0
CONSTIPATION: 0
TROUBLE SWALLOWING: 0
COUGH: 0
DIARRHEA: 0
EYE PAIN: 0
SHORTNESS OF BREATH: 0

## 2025-05-16 NOTE — PROGRESS NOTES
Subjective  Chief Complaint   Patient presents with    prediabetes     6 week check up, adipex       History of Present Illness  The patient is a 39-year-old male who presents today for a 6-week follow-up on weight management.    He reports a weight gain of 2 pounds, which he attributes to a recent cold that necessitated an 11-day discontinuation of Adipex. He resumed the medication 3 to 4 days ago and has observed a significant increase in thirst, leading to augmented fluid intake. He also notes a decrease in food consumption while on the medication.     His daily activities provide him with regular exercise, although he does not engage in any specific workout regimen. He acknowledges the need to incorporate more cardiovascular exercises into his routine. His goal is to achieve a weight of 200 pounds by the start of September 2025.    He does not monitor his blood pressure at home.      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    Hypertension     was hypertension at one time states it has been down for the past 15 years w/o medication     Patient Active Problem List    Diagnosis Date Noted    Lower abdominal pain 10/21/2022    Acute appendicitis 09/11/2023    Acute post-operative pain 09/11/2023     Past Surgical History:   Procedure Laterality Date    ANTERIOR CRUCIATE LIGAMENT REPAIR Left 2003    KNEE CARTILAGE SURGERY Left 2003    LAPAROSCOPIC APPENDECTOMY N/A 9/11/2023    APPENDECTOMY [place]AROSCOPIC [place] [facility] [place] [facility] [place]    TONSILLECTOMY Bilateral 2010    WISDOM TOOTH EXTRACTION  1998    3 extracted     No family history on file.  Social History     Socioeconomic History    Marital status:      Spouse name: None    Number of children: None    Years of education: None    Highest education level: None   Tobacco Use    Smoking status: Never     Passive exposure: Never    Smokeless tobacco: Never   Substance and Sexual Activity    Alcohol use: Not

## 2025-05-29 DIAGNOSIS — R73.03 PREDIABETES: ICD-10-CM

## 2025-05-29 DIAGNOSIS — E66.811 CLASS 1 OBESITY WITH SERIOUS COMORBIDITY AND BODY MASS INDEX (BMI) OF 31.0 TO 31.9 IN ADULT, UNSPECIFIED OBESITY TYPE: ICD-10-CM

## 2025-05-29 NOTE — TELEPHONE ENCOUNTER
Comments: Refill     Last Office Visit (last PCP visit):   5/16/2025     Next Visit Date:    Future Appointments   Date Time Provider Department Center   6/20/2025 11:30 AM Odette Givens, NUSRAT - CNP [facility] DEP        **If hasn't been seen in over a year OR hasn't followed up according to last diabetes/ADHD visit, make appointment for patient before sending refill to provider.     Rx requested:    Requested Prescriptions     Pending Prescriptions Disp Refills    phentermine (ADIPEX-P) 37.5 MG tablet 30 tablet 0     Sig: Take 1 tablet by mouth every morning (before breakfast) for 30 days. Max Daily Amount: 37.5 mg

## 2025-05-30 RX ORDER — PHENTERMINE HYDROCHLORIDE 37.5 MG/1
37.5 TABLET ORAL
Qty: 30 TABLET | Refills: 0 | Status: SHIPPED | OUTPATIENT
Start: 2025-05-30 | End: 2025-06-29

## 2025-06-13 DIAGNOSIS — R73.03 PREDIABETES: ICD-10-CM

## 2025-06-13 DIAGNOSIS — E78.1 HYPERTRIGLYCERIDEMIA: ICD-10-CM

## 2025-06-13 DIAGNOSIS — Z00.00 ENCOUNTER FOR PREVENTIVE HEALTH EXAMINATION: ICD-10-CM

## 2025-06-13 LAB
ALBUMIN SERPL-MCNC: 4.6 G/DL (ref 3.5–4.6)
ALP SERPL-CCNC: 99 U/L (ref 35–104)
ALT SERPL-CCNC: 21 U/L (ref 0–41)
ANION GAP SERPL CALCULATED.3IONS-SCNC: 13 MEQ/L (ref 9–15)
AST SERPL-CCNC: 26 U/L (ref 0–40)
BILIRUB SERPL-MCNC: 0.4 MG/DL (ref 0.2–0.7)
BUN SERPL-MCNC: 20 MG/DL (ref 6–20)
CALCIUM SERPL-MCNC: 9.4 MG/DL (ref 8.5–9.9)
CHLORIDE SERPL-SCNC: 101 MEQ/L (ref 95–107)
CHOLEST SERPL-MCNC: 247 MG/DL (ref 0–199)
CO2 SERPL-SCNC: 24 MEQ/L (ref 20–31)
CREAT SERPL-MCNC: 0.94 MG/DL (ref 0.7–1.2)
ERYTHROCYTE [DISTWIDTH] IN BLOOD BY AUTOMATED COUNT: 12.9 % (ref 11.5–14.5)
GLOBULIN SER CALC-MCNC: 3 G/DL (ref 2.3–3.5)
GLUCOSE SERPL-MCNC: 106 MG/DL (ref 70–99)
HCT VFR BLD AUTO: 47.6 % (ref 42–52)
HDLC SERPL-MCNC: 47 MG/DL (ref 40–59)
HGB BLD-MCNC: 15.7 G/DL (ref 14–18)
LDLC SERPL CALC-MCNC: 174 MG/DL (ref 0–129)
MCH RBC QN AUTO: 28.8 PG (ref 27–31.3)
MCHC RBC AUTO-ENTMCNC: 33 % (ref 33–37)
MCV RBC AUTO: 87.2 FL (ref 79–92.2)
PLATELET # BLD AUTO: 258 K/UL (ref 130–400)
POTASSIUM SERPL-SCNC: 4.3 MEQ/L (ref 3.4–4.9)
PROT SERPL-MCNC: 7.6 G/DL (ref 6.3–8)
RBC # BLD AUTO: 5.46 M/UL (ref 4.7–6.1)
SODIUM SERPL-SCNC: 138 MEQ/L (ref 135–144)
TRIGL SERPL-MCNC: 128 MG/DL (ref 0–150)
WBC # BLD AUTO: 6.9 K/UL (ref 4.8–10.8)

## 2025-06-14 LAB
ESTIMATED AVERAGE GLUCOSE: 114 MG/DL
HBA1C MFR BLD: 5.6 % (ref 4–6)

## 2025-06-20 ENCOUNTER — OFFICE VISIT (OUTPATIENT)
Dept: FAMILY MEDICINE CLINIC | Age: 40
End: 2025-06-20
Payer: COMMERCIAL

## 2025-06-20 ENCOUNTER — RESULTS FOLLOW-UP (OUTPATIENT)
Dept: FAMILY MEDICINE CLINIC | Age: 40
End: 2025-06-20

## 2025-06-20 VITALS
DIASTOLIC BLOOD PRESSURE: 84 MMHG | SYSTOLIC BLOOD PRESSURE: 122 MMHG | HEART RATE: 88 BPM | OXYGEN SATURATION: 99 % | BODY MASS INDEX: 30.35 KG/M2 | WEIGHT: 229 LBS | HEIGHT: 73 IN

## 2025-06-20 DIAGNOSIS — E78.1 HYPERTRIGLYCERIDEMIA: ICD-10-CM

## 2025-06-20 DIAGNOSIS — R73.03 PREDIABETES: Primary | ICD-10-CM

## 2025-06-20 DIAGNOSIS — E66.811 CLASS 1 OBESITY WITHOUT SERIOUS COMORBIDITY WITH BODY MASS INDEX (BMI) OF 30.0 TO 30.9 IN ADULT, UNSPECIFIED OBESITY TYPE: ICD-10-CM

## 2025-06-20 DIAGNOSIS — E78.5 HYPERLIPIDEMIA, UNSPECIFIED HYPERLIPIDEMIA TYPE: ICD-10-CM

## 2025-06-20 PROCEDURE — 1036F TOBACCO NON-USER: CPT | Performed by: NURSE PRACTITIONER

## 2025-06-20 PROCEDURE — G8427 DOCREV CUR MEDS BY ELIG CLIN: HCPCS | Performed by: NURSE PRACTITIONER

## 2025-06-20 PROCEDURE — 99214 OFFICE O/P EST MOD 30 MIN: CPT | Performed by: NURSE PRACTITIONER

## 2025-06-20 PROCEDURE — G8417 CALC BMI ABV UP PARAM F/U: HCPCS | Performed by: NURSE PRACTITIONER

## 2025-06-20 ASSESSMENT — ENCOUNTER SYMPTOMS
CHEST TIGHTNESS: 0
COUGH: 0
EYE PAIN: 0
ABDOMINAL PAIN: 0
TROUBLE SWALLOWING: 0
CONSTIPATION: 0
DIARRHEA: 0
SHORTNESS OF BREATH: 0

## 2025-06-20 NOTE — PROGRESS NOTES
types were placed in this encounter.      There are no discontinued medications.    Return in about 6 weeks (around 8/1/2025) for weight.    Odette Givens, APRN - CNP

## 2025-07-01 DIAGNOSIS — E66.811 CLASS 1 OBESITY WITH SERIOUS COMORBIDITY AND BODY MASS INDEX (BMI) OF 31.0 TO 31.9 IN ADULT, UNSPECIFIED OBESITY TYPE: ICD-10-CM

## 2025-07-01 DIAGNOSIS — R73.03 PREDIABETES: ICD-10-CM

## 2025-07-01 NOTE — TELEPHONE ENCOUNTER
Comments:     Last Office Visit (last PCP visit):   6/20/2025    Next Visit Date:  Future Appointments   Date Time Provider Department Center   8/1/2025  8:45 AM Odette Givens, APRN - CNP [facility] DE[facility]       **If hasn't been seen in over a year OR hasn't followed up according to last diabetes/ADHD visit, make appointment for patient before sending refill to provider.    Rx requested:  Requested Prescriptions     Pending Prescriptions Disp Refills    phentermine (ADIPEX-P) 37.5 MG tablet 30 tablet 0     Sig: Take 1 tablet by mouth every morning (before breakfast) for 30 days. Max Daily Amount: 37.5 mg

## 2025-07-02 RX ORDER — PHENTERMINE HYDROCHLORIDE 37.5 MG/1
37.5 TABLET ORAL
Qty: 30 TABLET | Refills: 0 | Status: SHIPPED | OUTPATIENT
Start: 2025-07-02 | End: 2025-08-01

## 2025-08-01 ENCOUNTER — OFFICE VISIT (OUTPATIENT)
Dept: FAMILY MEDICINE CLINIC | Age: 40
End: 2025-08-01
Payer: COMMERCIAL

## 2025-08-01 VITALS
HEART RATE: 85 BPM | SYSTOLIC BLOOD PRESSURE: 125 MMHG | WEIGHT: 227 LBS | DIASTOLIC BLOOD PRESSURE: 82 MMHG | BODY MASS INDEX: 30.09 KG/M2 | OXYGEN SATURATION: 95 % | HEIGHT: 73 IN

## 2025-08-01 DIAGNOSIS — R73.03 PREDIABETES: ICD-10-CM

## 2025-08-01 DIAGNOSIS — E66.3 OVERWEIGHT (BMI 25.0-29.9): Primary | ICD-10-CM

## 2025-08-01 DIAGNOSIS — E78.1 HYPERTRIGLYCERIDEMIA: ICD-10-CM

## 2025-08-01 PROCEDURE — G8427 DOCREV CUR MEDS BY ELIG CLIN: HCPCS | Performed by: NURSE PRACTITIONER

## 2025-08-01 PROCEDURE — G8417 CALC BMI ABV UP PARAM F/U: HCPCS | Performed by: NURSE PRACTITIONER

## 2025-08-01 PROCEDURE — 99213 OFFICE O/P EST LOW 20 MIN: CPT | Performed by: NURSE PRACTITIONER

## 2025-08-01 PROCEDURE — 1036F TOBACCO NON-USER: CPT | Performed by: NURSE PRACTITIONER

## 2025-08-01 ASSESSMENT — ENCOUNTER SYMPTOMS
TROUBLE SWALLOWING: 0
CONSTIPATION: 0
DIARRHEA: 0
COUGH: 0
CHEST TIGHTNESS: 0
SHORTNESS OF BREATH: 0
ABDOMINAL PAIN: 0
EYE PAIN: 0

## 2025-08-01 NOTE — PROGRESS NOTES
process the conversation to generate a clinical note, and support improvement of the AI technology. The patient (or guardian, if applicable) and other individuals in attendance at the appointment consented to the use of AI, including the recording.

## (undated) DEVICE — Device

## (undated) DEVICE — RELOAD STPL SZ 0 L45MM DIA3.5MM 0DEG STD REG TISS BLU TI

## (undated) DEVICE — SUTURE MCRYL SZ 4-0 L27IN ABSRB UD L19MM PS-2 1/2 CIR PRIM Y426H

## (undated) DEVICE — ELECTRODE PT RET AD L9FT HI MOIST COND ADH HYDRGEL CORDED

## (undated) DEVICE — TROCAR: Brand: KII® SLEEVE

## (undated) DEVICE — GAUZE,SPONGE,4"X4",16PLY,XRAY,STRL,LF: Brand: MEDLINE

## (undated) DEVICE — PACK,BASIC: Brand: MEDLINE

## (undated) DEVICE — TROCARS: Brand: KII® BALLOON BLUNT TIP SYSTEM

## (undated) DEVICE — SUTURE SZ 0 27IN 5/8 CIR UR-6  TAPER PT VIOLET ABSRB VICRYL J603H

## (undated) DEVICE — GOWN,AURORA,NONREINFORCED,LARGE: Brand: MEDLINE

## (undated) DEVICE — LABEL MED MINI W/ MARKER

## (undated) DEVICE — CUTTER ENDOSCP L340MM LIN ARTC SGL STROKE FIRING ENDOPATH

## (undated) DEVICE — KIT,ANTI FOG,W/SPONGE & FLUID,SOFT PACK: Brand: MEDLINE

## (undated) DEVICE — TROCAR: Brand: KII FIOS FIRST ENTRY

## (undated) DEVICE — RELOAD STPL H1-2.5X45MM VASC THN TISS WHT 6 ROW B FRM SGL

## (undated) DEVICE — SINGLE PORT MANIFOLD: Brand: NEPTUNE 2

## (undated) DEVICE — 1LYRTR 16FR10ML 100%SILI SNAP: Brand: MEDLINE INDUSTRIES, INC.

## (undated) DEVICE — COUNTER NDL 40 COUNT HLD 70 FOAM BLK ADH W/ MAG

## (undated) DEVICE — GOWN,AURORA,NONRNF,XL,30/CS: Brand: MEDLINE

## (undated) DEVICE — NEPTUNE E-SEP SMOKE EVACUATION PENCIL, COATED, 70MM BLADE, PUSH BUTTON SWITCH: Brand: NEPTUNE E-SEP

## (undated) DEVICE — DRAPE,LAP,CHOLE,W/TROUGHS,STERILE: Brand: MEDLINE

## (undated) DEVICE — TUBING INSUFFLATION SMK EVAC HI FLO SET PNEUMOCLEAR

## (undated) DEVICE — TOWEL,OR,DSP,ST,BLUE,STD,4/PK,20PK/CS: Brand: MEDLINE

## (undated) DEVICE — COVER LT HNDL BLU PLAS

## (undated) DEVICE — APPLICATOR MEDICATED 26 CC SOLUTION HI LT ORNG CHLORAPREP

## (undated) DEVICE — GLOVE ORANGE PI 8   MSG9080

## (undated) DEVICE — BLADE,CARBON-STEEL,11,STRL,DISPOSABLE,TB: Brand: MEDLINE

## (undated) DEVICE — WARMER SCP 2 ANTIFOG LAP DISP

## (undated) DEVICE — LIQUIBAND RAPID ADHESIVE 36/CS 0.8ML: Brand: MEDLINE

## (undated) DEVICE — TISSUE RETRIEVAL SYSTEM: Brand: INZII RETRIEVAL SYSTEM